# Patient Record
Sex: FEMALE | Race: WHITE | Employment: OTHER | URBAN - METROPOLITAN AREA
[De-identification: names, ages, dates, MRNs, and addresses within clinical notes are randomized per-mention and may not be internally consistent; named-entity substitution may affect disease eponyms.]

---

## 2017-01-16 ENCOUNTER — OFFICE VISIT (OUTPATIENT)
Dept: CARDIOLOGY CLINIC | Age: 82
End: 2017-01-16

## 2017-01-16 VITALS
HEART RATE: 68 BPM | WEIGHT: 173.8 LBS | HEIGHT: 61 IN | DIASTOLIC BLOOD PRESSURE: 70 MMHG | BODY MASS INDEX: 32.81 KG/M2 | SYSTOLIC BLOOD PRESSURE: 138 MMHG

## 2017-01-16 DIAGNOSIS — I10 HTN (HYPERTENSION), BENIGN: ICD-10-CM

## 2017-01-16 DIAGNOSIS — I10 ESSENTIAL HYPERTENSION: Primary | ICD-10-CM

## 2017-01-16 DIAGNOSIS — E78.5 DYSLIPIDEMIA: ICD-10-CM

## 2017-01-16 DIAGNOSIS — E55.9 VITAMIN D DEFICIENCY DISEASE: ICD-10-CM

## 2017-01-16 RX ORDER — HYDROCHLOROTHIAZIDE 25 MG/1
12.5 TABLET ORAL DAILY
Qty: 90 TAB | Refills: 2 | Status: SHIPPED | OUTPATIENT
Start: 2017-01-16 | End: 2018-03-09 | Stop reason: SDUPTHER

## 2017-01-16 RX ORDER — CARVEDILOL 12.5 MG/1
12.5 TABLET ORAL 2 TIMES DAILY WITH MEALS
Qty: 180 TAB | Refills: 3 | Status: SHIPPED | OUTPATIENT
Start: 2017-01-16 | End: 2017-08-23 | Stop reason: SDUPTHER

## 2017-01-16 NOTE — PROGRESS NOTES
Progress Note      Roosevelt Bess is a 80 y.o. female. Last seen 6 months ago by Dr. Dale Bansal. Problem List  Date Reviewed: 1/16/2017          Codes Class Noted    Dyslipidemia ICD-10-CM: E78.5  ICD-9-CM: 272.4  8/9/2013        Vitamin D deficiency disease ICD-10-CM: E55.9  ICD-9-CM: 268.9  8/9/2013        HTN (hypertension), benign ICD-10-CM: I10  ICD-9-CM: 401.1  6/25/2012            Cardiac testing:  Stress test cardiolite 5/18/12 - 3 min, normal perfusion, EF 72%   Echo 2d adult 5/18/12 - LVH, EF 60%   Echo 8/5/13 - EF 55 % to 60 %,no regional wall motion abnormalities. Wall thickness was mildly increased, mild MR, mild TR, no change from previous study 5/2012  Exercise Cardiolite 8/5/13 - normal, EF 70%  Echo 3/26/15- normal, EF 55 % to 60 %. Lexiscan cardiolite 3/26/15 - normal perfusion, LVEF 76%    HPI  She is doing well. She denies any interval issues. She has been less active recently due to the weather and fear of being outside. In the warmer months, she was walking often. She is now currently following a structured diet plan, notes occasional splurges. She is sleeping well. She denies any exertional chest pain, dyspnea, palpitations, syncope, orthopnea, edema or paroxysmal nocturnal dyspnea. Current Outpatient Prescriptions   Medication Sig Dispense Refill    hydrochlorothiazide (HYDRODIURIL) 25 mg tablet Take 0.5 Tabs by mouth daily. 90 Tab 3    carvedilol (COREG) 12.5 mg tablet Take 1 Tab by mouth two (2) times daily (with meals). 180 Tab 3    cholecalciferol, vitamin D3, (VITAMIN D3) 2,000 unit tab Take  by mouth.  aspirin 81 mg tablet Take 81 mg by mouth daily.             Allergies   Allergen Reactions    Statins-Hmg-Coa Reductase Inhibitors Myalgia        Past Medical History   Diagnosis Date    Dyslipidemia 8/9/2013    HTN (hypertension)     Hyperlipemia     Peripheral cyanosis     Peripheral neuropathy (HCC)     Tachycardia 6/25/2012    TIA (transient ischemic attack) 10/09    Vitamin D deficiency disease 8/9/2013      Review of Systems  Constitutional: Negative for weight loss, malaise/fatigue and diaphoresis. Respiratory: Negative for cough, hemoptysis, sputum production, shortness of breath and wheezing. Cardiovascular: Negative for chest pain, palpitations, orthopnea, claudication, leg swelling and PND. Gastrointestinal: Negative for heartburn, nausea, vomiting, blood in stool and melena. Genitourinary: Negative for dysuria, hematuria and flank pain. Musculoskeletal: Negative for back pain and joint pain. Neurological: Negative for dizziness, focal weakness, seizures, loss of consciousness, weakness and headaches. Endo/Heme/Allergies: Does not bruise/bleed easily. Psychiatric/Behavioral: Negative for mood disorders. Visit Vitals    /70    Pulse 68    Ht 5' 1\" (1.549 m)    Wt 173 lb 12.8 oz (78.8 kg)    BMI 32.84 kg/m2      Wt Readings from Last 3 Encounters:   01/16/17 173 lb 12.8 oz (78.8 kg)   07/11/16 170 lb (77.1 kg)   01/11/16 176 lb 6.4 oz (80 kg)     Physical Exam  General - well developed well nourished  Neck - JVP normal, thyroid normal  Cardiac - normal S1,S2, no murmurs, rubs or gallops. No clicks  Vascular - carotids without bruits, radials, femorals and pedal pulses equal bilateral  Lungs - clear to auscultation bilaterals, no rales, wheezing or rhonchi  Abd - soft nontender, no HSM, no abd bruits  Extremities - no edema  Skin - no rash  Neuro - nonfocal  Psych - normal mood and affect    Cardiographics  Lexiscan cardiolite 3/26/15 - normal perfusion, LVEF 76%  EKG 1/11/16 - SR, normal  EKG 1/16/17 - SR 65, normal     ASSESSMENT and PLAN  Encounter Diagnoses   Name Primary?  Essential hypertension Yes    HTN (hypertension), benign     Dyslipidemia     Vitamin D deficiency disease       Ms. Barbara Gan has chronic HTN controlled on combination therapy.  She has no evidence of ischemic or structural heart disease by [employer] Cardiolite in 3/2015. She denies any anginal symptoms at her current functional capacity. Encouraged her to seek out indoor walking opportunities to keep her aerobic exercise routine going. Encouraged a heart healthy diet. Advised her to monitor BP regularly at home and to call the office if SBP is >140 consistently. NMR profile shows lipid panel nearly at goal on no statin therapy (hx of myalgias). . Vitamin D within normal limit on daily replacement therapy. Plan to repeat NMR, CMP and Vit D again in 1 year. Encouraged her to call with any questions or concerns prior to her next office visit. Follow-up Disposition:  Return in about 1 year (around 1/16/2018).        Nicki Vieira NP

## 2017-01-16 NOTE — PROGRESS NOTES
Visit Vitals    /70    Pulse 68    Ht 5' 1\" (1.549 m)    Wt 173 lb 12.8 oz (78.8 kg)    BMI 32.84 kg/m2

## 2017-01-16 NOTE — PATIENT INSTRUCTIONS
Continue your current medications. Try to stay active and eat a heart healthy diet full of lean proteins, complex carbs/whole grains, fruits and veggies. Avoid simple/white carbs such as rice, bread, pasta and sweets. Keep checking your blood pressure every so often. Call the office if your top BP number is greater than 140 consistently.

## 2017-08-23 DIAGNOSIS — I10 HTN (HYPERTENSION), BENIGN: ICD-10-CM

## 2017-08-23 RX ORDER — CARVEDILOL 12.5 MG/1
TABLET ORAL
Qty: 180 TAB | Refills: 3 | Status: SHIPPED | OUTPATIENT
Start: 2017-08-23 | End: 2018-09-16 | Stop reason: SDUPTHER

## 2018-01-04 LAB
25(OH)D3+25(OH)D2 SERPL-MCNC: 28.8 NG/ML (ref 30–100)
ALBUMIN SERPL-MCNC: 4 G/DL (ref 3.5–4.7)
ALBUMIN/GLOB SERPL: 1.7 {RATIO} (ref 1.2–2.2)
ALP SERPL-CCNC: 78 IU/L (ref 39–117)
ALT SERPL-CCNC: 12 IU/L (ref 0–32)
AST SERPL-CCNC: 15 IU/L (ref 0–40)
BILIRUB SERPL-MCNC: 0.6 MG/DL (ref 0–1.2)
BUN SERPL-MCNC: 20 MG/DL (ref 8–27)
BUN/CREAT SERPL: 28 (ref 12–28)
CALCIUM SERPL-MCNC: 8.9 MG/DL (ref 8.7–10.3)
CHLORIDE SERPL-SCNC: 103 MMOL/L (ref 96–106)
CHOLEST SERPL-MCNC: 192 MG/DL (ref 100–199)
CO2 SERPL-SCNC: 28 MMOL/L (ref 18–29)
CREAT SERPL-MCNC: 0.72 MG/DL (ref 0.57–1)
GLOBULIN SER CALC-MCNC: 2.4 G/DL (ref 1.5–4.5)
GLUCOSE SERPL-MCNC: 96 MG/DL (ref 65–99)
HDLC SERPL-MCNC: 61 MG/DL
INTERPRETATION, 910389: NORMAL
LDLC SERPL CALC-MCNC: 109 MG/DL (ref 0–99)
POTASSIUM SERPL-SCNC: 4.2 MMOL/L (ref 3.5–5.2)
PROT SERPL-MCNC: 6.4 G/DL (ref 6–8.5)
SODIUM SERPL-SCNC: 142 MMOL/L (ref 134–144)
TRIGL SERPL-MCNC: 110 MG/DL (ref 0–149)
VLDLC SERPL CALC-MCNC: 22 MG/DL (ref 5–40)

## 2018-01-15 ENCOUNTER — OFFICE VISIT (OUTPATIENT)
Dept: CARDIOLOGY CLINIC | Age: 83
End: 2018-01-15

## 2018-01-15 VITALS
RESPIRATION RATE: 16 BRPM | HEART RATE: 70 BPM | HEIGHT: 61 IN | DIASTOLIC BLOOD PRESSURE: 84 MMHG | WEIGHT: 175.2 LBS | OXYGEN SATURATION: 99 % | BODY MASS INDEX: 33.08 KG/M2 | SYSTOLIC BLOOD PRESSURE: 142 MMHG

## 2018-01-15 DIAGNOSIS — E78.5 DYSLIPIDEMIA: ICD-10-CM

## 2018-01-15 DIAGNOSIS — I10 HTN (HYPERTENSION), BENIGN: Primary | ICD-10-CM

## 2018-01-15 NOTE — PROGRESS NOTES
Progress Note      Nick Hickey is a 80 y.o. female. Last seen 1 year ago by Kobe Hernandez NP and 1.5 years ago by me. Problem List  Date Reviewed: 1/16/2017          Codes Class Noted    Dyslipidemia ICD-10-CM: E78.5  ICD-9-CM: 272.4  8/9/2013        Vitamin D deficiency disease ICD-10-CM: E55.9  ICD-9-CM: 268.9  8/9/2013        HTN (hypertension), benign ICD-10-CM: I10  ICD-9-CM: 401.1  6/25/2012            Cardiac testing:  Stress test cardiolite 5/18/12 - 3 min, normal perfusion, EF 72%   Echo 2d adult 5/18/12 - LVH, EF 60%   Echo 8/5/13 - EF 55 % to 60 %,no regional wall motion abnormalities. Wall thickness was mildly increased, mild MR, mild TR, no change from previous study 5/2012  Exercise Cardiolite 8/5/13 - normal, EF 70%  Echo 3/26/15- normal, EF 55 % to 60 %. Lexiscan cardiolite 3/26/15 - normal perfusion, LVEF 76%    HPI  She is doing well overall and denies any interval issues. She is content with her quality of life. Her BPs at home have been under control. She denies any decreased appetite, difficulty sleeping, exertional chest pain, dyspnea, palpitations, syncope, orthopnea, edema or paroxysmal nocturnal dyspnea. She enjoys spending time with her 8 great grandchildren. Patient continues to remain independent and continues to drive. Current Outpatient Prescriptions   Medication Sig Dispense Refill    carvedilol (COREG) 12.5 mg tablet TAKE 1 TABLET BY MOUTH TWO TIMES A DAY WITH MEALS 180 Tab 3    hydroCHLOROthiazide (HYDRODIURIL) 25 mg tablet Take 0.5 Tabs by mouth daily. 90 Tab 2    aspirin 81 mg tablet Take 81 mg by mouth daily.  cholecalciferol, vitamin D3, (VITAMIN D3) 2,000 unit tab Take  by mouth.             Allergies   Allergen Reactions    Statins-Hmg-Coa Reductase Inhibitors Myalgia        Past Medical History:   Diagnosis Date    Dyslipidemia 8/9/2013    HTN (hypertension)     Hyperlipemia     Peripheral cyanosis     Peripheral neuropathy     Tachycardia 6/25/2012    TIA (transient ischemic attack) 10/09    Vitamin D deficiency disease 8/9/2013      Review of Systems  Constitutional: Negative for weight loss, malaise/fatigue and diaphoresis. Respiratory: Negative for cough, hemoptysis, sputum production, shortness of breath and wheezing. Cardiovascular: Negative for chest pain, palpitations, orthopnea, claudication, leg swelling and PND. Gastrointestinal: Negative for heartburn, nausea, vomiting, blood in stool and melena. Genitourinary: Negative for dysuria, hematuria and flank pain. Musculoskeletal: Negative for back pain and joint pain. Neurological: Negative for dizziness, focal weakness, seizures, loss of consciousness, weakness and headaches. Endo/Heme/Allergies: Does not bruise/bleed easily. Psychiatric/Behavioral: Negative for mood disorders. Visit Vitals    /84 (BP 1 Location: Left arm, BP Patient Position: Sitting)    Pulse 70    Resp 16    Ht 5' 1\" (1.549 m)    Wt 175 lb 3.2 oz (79.5 kg)    SpO2 99%    BMI 33.1 kg/m2      Wt Readings from Last 3 Encounters:   01/15/18 175 lb 3.2 oz (79.5 kg)   01/16/17 173 lb 12.8 oz (78.8 kg)   07/11/16 170 lb (77.1 kg)     Physical Exam  General - well developed well nourished  Neck - JVP normal, thyroid normal  Cardiac - normal S1,S2, no murmurs, rubs or gallops. No clicks  Vascular - carotids without bruits, radials, femorals and pedal pulses equal bilateral  Lungs - clear to auscultation bilaterals, no rales, wheezing or rhonchi  Abd - soft nontender, no HSM, no abd bruits  Extremities - no edema  Skin - no rash  Neuro - nonfocal  Psych - normal mood and affect    Cardiographics  Lexiscan cardiolite 3/26/15 - normal perfusion, LVEF 76%  EKG 1/11/16 - SR, normal  EKG 1/16/17 - SR 65, normal   EKG 1/15/18- SR, LAHB. LAHB is newly noted from 1 year ago. ASSESSMENT and PLAN  Encounter Diagnoses   Name Primary?  HTN (hypertension), benign Yes    Dyslipidemia       Ms. David Sanchez has chronic HTN controlled on combination therapy. She has no evidence of ischemic or structural heart disease by previous testing dating back to 2015. Her quality of life remains good at the age of 80. Lipids and metabolic profile are normal. I do not think annual testing is necessary at this time since she is on no statin therapy. Follow-up Disposition:  Return in about 1 year (around 1/15/2019).      Written by Robert Bergeron, dictated by Alondra Zuniga MD.  Alondra Zuniga MD

## 2018-01-15 NOTE — MR AVS SNAPSHOT
Visit Information Date & Time Provider Department Dept. Phone Encounter #  
 1/15/2018 10:00 AM Matthew Soni MD [facility] [place] [phone] [id number] Follow-up Instructions Return in about 1 year (around 1/15/2019). Upcoming Health Maintenance Date Due DTaP/Tdap/Td series (1 - Tdap) 3/25/1953 ZOSTER VACCINE AGE 60> 1/25/1992 GLAUCOMA SCREENING Q2Y 3/25/1997 OSTEOPOROSIS SCREENING (DEXA) 3/25/1997 Pneumococcal 65+ Low/Medium Risk (1 of 2 - PCV13) 3/25/1997 MEDICARE YEARLY EXAM 3/25/1997 Influenza Age 5 to Adult 8/1/2017 Allergies as of 1/15/2018  Review Complete On: 1/15/2018 By: Mame Suresh LPN Severity Noted Reaction Type Reactions Statins-hmg-coa Reductase Inhibitors  07/06/2015    Myalgia Current Immunizations  Never Reviewed No immunizations on file. Not reviewed this visit You Were Diagnosed With   
  
 Codes Comments HTN (hypertension), benign    -  Primary ICD-10-CM: I10 
ICD-9-CM: 401.1 Dyslipidemia     ICD-10-CM: E78.5 ICD-9-CM: 272.4 Vitals BP Pulse Resp Height(growth percentile) Weight(growth percentile) SpO2  
 142/84 (BP 1 Location: Left arm, BP Patient Position: Sitting) 70 16 5' 1\" (1.549 m) 175 lb 3.2 oz (79.5 kg) 99% BMI OB Status Smoking Status 33.1 kg/m2 Postmenopausal Never Smoker BMI and BSA Data Body Mass Index Body Surface Area  
 33.1 kg/m 2 1.85 m 2 Preferred Pharmacy Pharmacy Name Phone [id number] [address] [phone] Your Updated Medication List  
  
   
This list is accurate as of: 1/15/18 10:05 AM.  Always use your most recent med list.  
  
  
  
  
 aspirin 81 mg tablet Take 81 mg by mouth daily. carvedilol 12.5 mg tablet Commonly known as:  COREG  
TAKE 1 TABLET BY MOUTH TWO TIMES A DAY WITH MEALS  
  
 hydroCHLOROthiazide 25 mg tablet Commonly known as:  HYDRODIURIL Take 0.5 Tabs by mouth daily. VITAMIN D3 2,000 unit Tab Generic drug:  cholecalciferol (vitamin D3) Take  by mouth. We Performed the Following AMB POC EKG ROUTINE W/ 12 LEADS, INTER & REP [45199 CPT(R)] Follow-up Instructions Return in about 1 year (around 1/15/2019). Introducing [facility]! Carmelita Fothergill introduces Compute patient portal. Now you can access parts of your medical record, email your doctor's office, and request medication refills online. 1. In your internet browser, go to [email or web address] 2. Click on the First Time User? Click Here link in the Sign In box. You will see the New Member Sign Up page. 3. Enter your Compute Access Code exactly as it appears below. You will not need to use this code after youve completed the sign-up process. If you do not sign up before the expiration date, you must request a new code. · Compute Access Code: [email or web address] Expires: 4/15/2018 10:05 AM 
 
4. Enter the last four digits of your Social Security Number (xxxx) and Date of Birth (mm/dd/yyyy) as indicated and click Submit. You will be taken to the next sign-up page. 5. Create a Compute ID. This will be your Compute login ID and cannot be changed, so think of one that is secure and easy to remember. 6. Create a Compute password. You can change your password at any time. 7. Enter your Password Reset Question and Answer. This can be used at a later time if you forget your password. 8. Enter your e-mail address. You will receive e-mail notification when new information is available in [address] 9. Click Sign Up. You can now view and download portions of your medical record. 10. Click the Download Summary menu link to download a portable copy of your medical information.  
 
If you have questions, please visit the Frequently Asked Questions section of the [employer]. Remember, [employer] is NOT to be used for urgent needs. For medical emergencies, dial 911. Now available from your iPhone and Android! Please provide this summary of care documentation to your next provider. Your primary care clinician is listed as Nara Mcdowell. If you have any questions after today's visit, please call [phone].

## 2018-09-16 DIAGNOSIS — I10 HTN (HYPERTENSION), BENIGN: ICD-10-CM

## 2018-09-17 RX ORDER — CARVEDILOL 12.5 MG/1
TABLET ORAL
Qty: 180 TAB | Refills: 1 | Status: SHIPPED | OUTPATIENT
Start: 2018-09-17 | End: 2019-03-19 | Stop reason: SDUPTHER

## 2019-01-23 ENCOUNTER — OFFICE VISIT (OUTPATIENT)
Dept: FAMILY MEDICINE CLINIC | Age: 84
End: 2019-01-23

## 2019-01-23 VITALS
DIASTOLIC BLOOD PRESSURE: 76 MMHG | HEIGHT: 61 IN | WEIGHT: 173 LBS | BODY MASS INDEX: 32.66 KG/M2 | HEART RATE: 70 BPM | RESPIRATION RATE: 16 BRPM | OXYGEN SATURATION: 100 % | SYSTOLIC BLOOD PRESSURE: 133 MMHG | TEMPERATURE: 97.8 F

## 2019-01-23 DIAGNOSIS — E78.5 DYSLIPIDEMIA: ICD-10-CM

## 2019-01-23 DIAGNOSIS — H53.9 VISION CHANGES: ICD-10-CM

## 2019-01-23 DIAGNOSIS — I10 HTN (HYPERTENSION), BENIGN: Primary | ICD-10-CM

## 2019-01-23 DIAGNOSIS — Z01.818 PREOP EXAMINATION: ICD-10-CM

## 2019-01-23 RX ORDER — LISINOPRIL 5 MG/1
TABLET ORAL DAILY
COMMUNITY
End: 2020-02-01

## 2019-01-23 RX ORDER — OFLOXACIN 3 MG/ML
SOLUTION/ DROPS OPHTHALMIC
Refills: 1 | COMMUNITY
Start: 2019-01-21 | End: 2019-04-23 | Stop reason: ALTCHOICE

## 2019-01-23 RX ORDER — NEPAFENAC 3 MG/ML
SUSPENSION OPHTHALMIC
Refills: 1 | COMMUNITY
Start: 2019-01-21 | End: 2019-04-23 | Stop reason: ALTCHOICE

## 2019-01-23 RX ORDER — DUREZOL 0.5 MG/ML
EMULSION OPHTHALMIC
Refills: 1 | COMMUNITY
Start: 2019-01-21 | End: 2019-04-23 | Stop reason: ALTCHOICE

## 2019-01-23 RX ORDER — PRAVASTATIN SODIUM 20 MG/1
20 TABLET ORAL
COMMUNITY
End: 2020-02-01

## 2019-01-23 NOTE — PROGRESS NOTES
Identified pt with two pt identifiers(name and ). Chief Complaint   Patient presents with   Kiki Arboleda     cataract surgery tomorrow Dr. Ela Carlin 2019    Form Completion        Health Maintenance Due   Topic    DTaP/Tdap/Td series (1 - Tdap)    Shingrix Vaccine Age 50> (1 of 2)    GLAUCOMA SCREENING Q2Y     Bone Densitometry (Dexa) Screening     Pneumococcal 65+ Low/Medium Risk (1 of 2 - PCV13)    Influenza Age 5 to Adult     MEDICARE YEARLY EXAM        Wt Readings from Last 3 Encounters:   19 173 lb (78.5 kg)   01/15/18 175 lb 3.2 oz (79.5 kg)   17 173 lb 12.8 oz (78.8 kg)     Temp Readings from Last 3 Encounters:   13 97.7 °F (36.5 °C)   13 98.3 °F (36.8 °C)     BP Readings from Last 3 Encounters:   01/15/18 142/84   17 138/70   16 145/90     Pulse Readings from Last 3 Encounters:   01/15/18 70   17 68   16 88         Learning Assessment:  :     Learning Assessment 2014   PRIMARY LEARNER Patient   HIGHEST LEVEL OF EDUCATION - PRIMARY LEARNER  DID NOT GRADUATE HIGH SCHOOL   BARRIERS PRIMARY LEARNER NONE   CO-LEARNER CAREGIVER No   PRIMARY LANGUAGE ENGLISH   LEARNER PREFERENCE PRIMARY READING   LEARNING SPECIAL TOPICS none   ANSWERED BY patient   RELATIONSHIP SELF       Depression Screening:  :     No flowsheet data found. Fall Risk Assessment:  :     No flowsheet data found. Abuse Screening:  :     No flowsheet data found. Coordination of Care Questionnaire:  :     1) Have you been to an emergency room, urgent care clinic since your last visit? no  Hospitalized since your last visit? no             2) Have you seen or consulted any other health care providers outside of [address] [place] [address] since your last visit? yes  Cardiologist Dr. Leslie Arthur Doctor Dr. Ela Carlin  (Include any pap smears or colon screenings in this section.)    3) Do you have an Advance Directive on file?  no  Are you interested in receiving information about Advance Directives? no    Patient is accompanied by self I have received verbal consent from Theola Marker to discuss any/all medical information while they are present in the room. Reviewed record in preparation for visit and have obtained necessary documentation. Medication reconciliation up to date and corrected with patient at this time.

## 2019-01-23 NOTE — PROGRESS NOTES
Preoperative Evaluation    Date of Exam: 2019    Edd Brewer is a 80 y.o. female (:3/25/1932) who presents for preoperative evaluation. Procedure/Surgery: cataract surgery on left eye and on 19 on the right eye under regional anesthesia. Date of Procedure/Surgery: tomorrow    Surgeon: Dr Piyush Regan: [facility]    Primary Physician: Divina Blanco MD    Latex Allergy: no    Recent use of: No recent use of aspirin (ASA), NSAIDS or steroids. States she has not used ASA in 2 years. Anesthesia Complications: None    History of abnormal bleeding : None    History of Blood Transfusions: no    Health Care Directive or Living Will: no and is full code    She is able to climb a flight of stairs without chest pain or severe SOB    ETOH use:  No   Smoking status:  never    Pulmonary Risk: low risk    Allergies- reviewed: Allergies   Allergen Reactions    Statins-Hmg-Coa Reductase Inhibitors Myalgia         Medications- reviewed:   Current Outpatient Medications   Medication Sig    DUREZOL 0.05 % ophthalmic emulsion INSTILL 1 DROP INTO LEFT EYE FOUR TIMES A DAY AFTER SURGERY    ILEVRO 0.3 % drps INSTILL 1 DROP INTO LEFT EYE ONCE A DAY BEGIN ONE DAY BEFORE SURGERY    ofloxacin (FLOXIN) 0.3 % ophthalmic solution PUT 1 DROP INTO SURGERY EYE 4 TIMES A DAY STARTING THE DAY BEFORE SURGERY    carvedilol (COREG) 12.5 mg tablet TAKE 1 TABLET BY MOUTH TWO TIMES A DAY WITH MEALS    hydroCHLOROthiazide (HYDRODIURIL) 25 mg tablet TAKE 1/2 TABLET BY MOUTH DAILY.  lisinopril (PRINIVIL, ZESTRIL) 5 mg tablet Take  by mouth daily.  pravastatin (PRAVACHOL) 20 mg tablet Take 20 mg by mouth nightly.  cholecalciferol, vitamin D3, (VITAMIN D3) 2,000 unit tab Take  by mouth.  aspirin 81 mg tablet Take 81 mg by mouth daily. No current facility-administered medications for this visit.           Past Medical History- reviewed:  Past Medical History: Diagnosis Date    Chronic kidney failure, stage 2 (mild)     Dyslipidemia 8/9/2013    HTN (hypertension)     Hyperlipemia     Labile hypertension     Peripheral cyanosis     Peripheral cyanosis     Peripheral neuropathy     Peripheral neuropathy     Recurrent UTI     Tachycardia 6/25/2012    TIA (transient ischemic attack) 10/09    Vitamin D deficiency disease 8/9/2013         Past Surgical History- reviewed:   Past Surgical History:   Procedure Laterality Date    ECHO 2D ADULT  5/18/12    LVH, EF 60%    HX HERNIA REPAIR      right inguinal    STRESS TEST CARDIOLITE  5/18/12    3 min, normal perfusion, EF 72%         Social History- reviewed:  Social History     Socioeconomic History    Marital status:      Spouse name: Not on file    Number of children: Not on file    Years of education: Not on file    Highest education level: Not on file   Social Needs    Financial resource strain: Not on file    Food insecurity - worry: Not on file    Food insecurity - inability: Not on file   [employer] needs - medical: Not on file   [employer] needs - non-medical: Not on file   Occupational History    Not on file   Tobacco Use    Smoking status: Never Smoker    Smokeless tobacco: Never Used   Substance and Sexual Activity    Alcohol use: No    Drug use: No    Sexual activity: Not Currently   Other Topics Concern    Not on file   Social History Narrative    Not on file         Immunizations- reviewed: There is no immunization history on file for this patient. Review of systems:  Items bolded if positive. Constitutional: Fever, chills, night sweats, weight loss, lymphadenopathy, fatigue  HEENT: Vision change, eye pain, rhinorrhea, sinus pain, epistaxis, dysphagia, change in hearing, tinnitus, vertigo.    Endocrine: Weight change, heat/ cold intolerance, tremor, insomnia, polyuria, polydipsia, polyphagia, abnl hair growth, nail changes  Cardiovascular: Chest pain, palpitations, syncope, lower extremity edema, orthopnea, paroxysmal nocturnal dyspnea  Pulmonary: Shortness of breath, dyspnea on exertion, cough, hemoptysis, wheezing  GI: Nausea, vomiting, diarrhea, melena, hematochezia, change in appetite, abdominal pain, change in bowel habits or stools  : Dysuria, frequency, urgency, incontinence, hematuria, nocturia  Musculoskeletal: joint swelling or pain, muscle pain, back pain  Skin:  Rash, New/growing/changing skin lesions  Neurologic: Headache, muscle weakness, paresthesias, anesthesia, ataxia, change in speech, change in gait   Psychiatric: depression, anxiety, hallucinations, herrera, SI/HI      EXAM:   Visit Vitals  /76 (BP 1 Location: Left arm, BP Patient Position: Sitting)   Pulse 70   Temp 97.8 °F (36.6 °C) (Oral)   Resp 16   Ht 5' 1\" (1.549 m)   Wt 173 lb (78.5 kg)   SpO2 100%   BMI 32.69 kg/m²       General  no distress, well developed, well nourished  HEENT  oropharynx clear and moist mucous membranes  Eyes   EOMI and Conjunctivae Clear Bilaterally  Neck   full range of motion and supple  Respiratory  Clear Breath Sounds Bilaterally, No Increased Effort and Good Air Movement Bilaterally  Cardiovascular   RRR, S1S2, No murmur and Radial/Pedal Pulses 2+  Abdomen  soft, non tender and non distended  Skin  No Rash and Cap Refill less than 3 sec  Musculoskeletal no swelling or tenderness and strength normal and equal bilaterally  Neurology  AAO and CN II - XII grossly intact    Lab Results   Component Value Date/Time    Sodium 142 01/03/2018 12:00 AM    Potassium 4.2 01/03/2018 12:00 AM    Chloride 103 01/03/2018 12:00 AM    CO2 28 01/03/2018 12:00 AM    Anion gap 12 01/23/2013 08:08 PM    Glucose 96 01/03/2018 12:00 AM    BUN 20 01/03/2018 12:00 AM    Creatinine 0.72 01/03/2018 12:00 AM    BUN/Creatinine ratio 28 01/03/2018 12:00 AM    GFR est AA 88 01/03/2018 12:00 AM    GFR est non-AA 77 01/03/2018 12:00 AM    Calcium 8.9 01/03/2018 12:00 AM    Bilirubin, total 0.6 01/03/2018 12:00 AM    AST (SGOT) 15 01/03/2018 12:00 AM    Alk. phosphatase 78 01/03/2018 12:00 AM    Protein, total 6.4 01/03/2018 12:00 AM    Albumin 4.0 01/03/2018 12:00 AM    Globulin 3.5 09/18/2013 12:28 AM    A-G Ratio 1.7 01/03/2018 12:00 AM    ALT (SGPT) 12 01/03/2018 12:00 AM     Lab Results   Component Value Date/Time    WBC 8.2 09/18/2013 12:28 AM    WBC 6.6 06/25/2012 12:03 PM    Hemoglobin (POC) 13.3 09/18/2013 12:30 AM    HGB 13.3 09/18/2013 12:28 AM    Hematocrit (POC) 39 09/18/2013 12:30 AM    HCT 39.7 09/18/2013 12:28 AM    PLATELET 811 77/08/5072 12:28 AM    MCV 89.0 09/18/2013 12:28 AM     Lab Results   Component Value Date/Time    Cholesterol, total 192 01/03/2018 12:00 AM    HDL Cholesterol 61 01/03/2018 12:00 AM    LDL, calculated 109 (H) 01/03/2018 12:00 AM    VLDL, calculated 22 01/03/2018 12:00 AM    Triglyceride 110 01/03/2018 12:00 AM               IMPRESSION:     ICD-10-CM ICD-9-CM    1. HTN (hypertension), benign I10 401.1    2. Dyslipidemia E78.5 272.4    3. Preop examination Z01.818 V72.84    4. Vision changes H53.9 368.9        Pt presents for preoperative evaluation for preop for cataract surgery and is an acceptable candidate. BP well controlled. Hyperlipidemia stable. Chronic conditions are stable. Orders Placed This Encounter    lisinopril (PRINIVIL, ZESTRIL) 5 mg tablet     Sig: Take  by mouth daily.  pravastatin (PRAVACHOL) 20 mg tablet     Sig: Take 20 mg by mouth nightly.  DUREZOL 0.05 % ophthalmic emulsion     Sig: INSTILL 1 DROP INTO LEFT EYE FOUR TIMES A DAY AFTER SURGERY     Refill:  1    ILEVRO 0.3 % drps     Sig: INSTILL 1 DROP INTO LEFT EYE ONCE A DAY BEGIN ONE DAY BEFORE SURGERY     Refill:  1    ofloxacin (FLOXIN) 0.3 % ophthalmic solution     Sig: PUT 1 DROP INTO SURGERY EYE 4 TIMES A DAY STARTING THE DAY BEFORE SURGERY     Refill:  1         Estrella Bartlett MD  Family Medicine Resident

## 2019-03-20 DIAGNOSIS — I10 HTN (HYPERTENSION), BENIGN: ICD-10-CM

## 2019-03-20 RX ORDER — HYDROCHLOROTHIAZIDE 12.5 MG/1
12.5 TABLET ORAL DAILY
Qty: 30 TAB | Refills: 1 | Status: SHIPPED | OUTPATIENT
Start: 2019-03-20 | End: 2019-05-03 | Stop reason: SDUPTHER

## 2019-04-23 ENCOUNTER — OFFICE VISIT (OUTPATIENT)
Dept: CARDIOLOGY CLINIC | Age: 84
End: 2019-04-23

## 2019-04-23 VITALS
OXYGEN SATURATION: 96 % | DIASTOLIC BLOOD PRESSURE: 80 MMHG | HEART RATE: 74 BPM | HEIGHT: 61 IN | RESPIRATION RATE: 20 BRPM | SYSTOLIC BLOOD PRESSURE: 144 MMHG | BODY MASS INDEX: 32.66 KG/M2 | WEIGHT: 173 LBS

## 2019-04-23 DIAGNOSIS — E78.5 DYSLIPIDEMIA: ICD-10-CM

## 2019-04-23 DIAGNOSIS — I10 HTN (HYPERTENSION), BENIGN: Primary | ICD-10-CM

## 2019-04-23 NOTE — LETTER
4/29/19 Patient: Andrew Monroe YOB: 1932 Date of Visit: 4/23/2019 Kolton Naidu MD 
[address] [facility] [address] [facility] [address] [id number] VIA In Basket Dear Kolton Naidu MD, Thank you for referring Ms. Dakota Andre to [facility] for evaluation. My notes for this consultation are attached. If you have questions, please do not hesitate to call me. I look forward to following your patient along with you. Sincerely, Mila Tian MD

## 2019-04-23 NOTE — PROGRESS NOTES
Progress Note      Jae Thapa is a 80 y.o. female. Last seen 1 year ago by Rosenda Jarrett, RAHEEM and 1.5 years ago by me. Problem List  Date Reviewed: 1/23/2019          Codes Class Noted    Dyslipidemia ICD-10-CM: E78.5  ICD-9-CM: 272.4  8/9/2013        Vitamin D deficiency disease ICD-10-CM: E55.9  ICD-9-CM: 268.9  8/9/2013        HTN (hypertension), benign ICD-10-CM: I10  ICD-9-CM: 401.1  6/25/2012            Cardiac testing:  Stress test cardiolite 5/18/12 - 3 min, normal perfusion, EF 72%   Echo 2d adult 5/18/12 - LVH, EF 60%   Echo 8/5/13 - EF 55 % to 60 %,no regional wall motion abnormalities. Wall thickness was mildly increased, mild MR, mild TR, no change from previous study 5/2012  Exercise Cardiolite 8/5/13 - normal, EF 70%  Echo 3/26/15- normal, EF 55 % to 60 %. Lexiscan cardiolite 3/26/15 - normal perfusion, LVEF 76%    HPI    Ms. Shahrzad Lloyd reports doing well, no interval issues. She stays busy babysitting her great grandchildren  Checks BP occasionally at home, reports BP have been good as they run a little lower than 140's/80's  She is content with her quality of life; remains independent living by herself and driving. She denies any decreased appetite, difficulty sleeping, exertional chest pain, dyspnea, palpitations, syncope, orthopnea, edema or paroxysmal nocturnal dyspnea. She enjoys spending time with her 8 great grandchildren. Patient continues to remain independent and continues to drive. Current Outpatient Medications   Medication Sig Dispense Refill    hydroCHLOROthiazide (HYDRODIURIL) 12.5 mg tablet Take 1 Tab by mouth daily. 30 Tab 1    carvedilol (COREG) 12.5 mg tablet TAKE 1 TABLET BY MOUTH TWO TIMES A DAY WITH MEALS 180 Tab 0    lisinopril (PRINIVIL, ZESTRIL) 5 mg tablet Take  by mouth daily.  pravastatin (PRAVACHOL) 20 mg tablet Take 20 mg by mouth nightly.  cholecalciferol, vitamin D3, (VITAMIN D3) 2,000 unit tab Take  by mouth.       aspirin 81 mg tablet Take 81 mg by mouth daily. Allergies   Allergen Reactions    Statins-Hmg-Coa Reductase Inhibitors Myalgia        Past Medical History:   Diagnosis Date    Chronic kidney failure, stage 2 (mild)     Dyslipidemia 8/9/2013    HTN (hypertension)     Hyperlipemia     Labile hypertension     Peripheral cyanosis     Peripheral cyanosis     Peripheral neuropathy     Peripheral neuropathy     Recurrent UTI     Tachycardia 6/25/2012    TIA (transient ischemic attack) 10/09    Vitamin D deficiency disease 8/9/2013      Review of Systems  Constitutional: Negative for weight loss, malaise/fatigue and diaphoresis. Respiratory: Negative for cough, hemoptysis, sputum production, shortness of breath and wheezing. Cardiovascular: Negative for chest pain, palpitations, orthopnea, claudication, leg swelling and PND. Gastrointestinal: Negative for heartburn, nausea, vomiting, blood in stool and melena. Genitourinary: Negative for dysuria, hematuria and flank pain. Musculoskeletal: Negative for back pain and joint pain. Neurological: Negative for dizziness, focal weakness, seizures, loss of consciousness, weakness and headaches. Endo/Heme/Allergies: Does not bruise/bleed easily. Psychiatric/Behavioral: Negative for mood disorders. Visit Vitals  /80   Pulse 74   Resp 20   Ht 5' 1\" (1.549 m)   Wt 173 lb (78.5 kg)   SpO2 96%   BMI 32.69 kg/m²      Wt Readings from Last 3 Encounters:   04/23/19 173 lb (78.5 kg)   01/23/19 173 lb (78.5 kg)   01/15/18 175 lb 3.2 oz (79.5 kg)     Physical Exam  General - well developed well nourished  Neck - JVP normal, thyroid normal  Cardiac - normal S1,S2, no murmurs, rubs or gallops.  No clicks  Vascular - carotids without bruits, radials, femorals and pedal pulses equal bilateral  Lungs - clear to auscultation bilaterals, no rales, wheezing or rhonchi  Abd - soft nontender, no HSM, no abd bruits  Extremities - no edema  Skin - no rash  Neuro - nonfocal  Psych - normal mood and affect    Cardiographics  Lexiscan cardiolite 3/26/15 - normal perfusion, LVEF 76%  EKG 1/11/16 - SR, normal  EKG 1/16/17 - SR 65, normal   EKG 1/15/18- SR, LAHB. LAHB is newly noted from 1 year ago. EKG 4/23/19- SR, LAHB, no significant change from 1/15/18    ASSESSMENT and PLAN  Encounter Diagnoses   Name Primary?  HTN (hypertension), benign Yes    Dyslipidemia       Ms. Rigoberto Rojas has chronic HTN controlled on combination therapy. She has no evidence of ischemic or structural heart disease by previous testing dating back to 2015. Her quality of life remains good at the age of 80. She enjoys taking care of her great grandchildren. She remains fiercely independent. Follow-up and Dispositions    · Return in about 1 year (around 4/23/2020).           Written by Jay Palomares, as dictated by Dr. Edwige Viramontes MD.   Edwige Viramontes MD

## 2019-04-23 NOTE — PROGRESS NOTES
Visit Vitals  /80   Pulse 74   Resp 20   Ht 5' 1\" (1.549 m)   Wt 173 lb (78.5 kg)   SpO2 96%   BMI 32.69 kg/m²     EKG today  Denies CP,SOB,edema or dizziness

## 2019-05-03 DIAGNOSIS — I10 HTN (HYPERTENSION), BENIGN: ICD-10-CM

## 2019-05-03 RX ORDER — HYDROCHLOROTHIAZIDE 12.5 MG/1
12.5 TABLET ORAL DAILY
Qty: 90 TAB | Refills: 3 | Status: SHIPPED | OUTPATIENT
Start: 2019-05-03 | End: 2020-04-15 | Stop reason: SDUPTHER

## 2020-02-01 ENCOUNTER — HOSPITAL ENCOUNTER (EMERGENCY)
Age: 85
Discharge: HOME | End: 2020-02-01
Attending: EMERGENCY MEDICINE | Admitting: EMERGENCY MEDICINE
Payer: MEDICARE

## 2020-02-01 VITALS
WEIGHT: 171.74 LBS | RESPIRATION RATE: 16 BRPM | HEART RATE: 79 BPM | DIASTOLIC BLOOD PRESSURE: 81 MMHG | OXYGEN SATURATION: 98 % | SYSTOLIC BLOOD PRESSURE: 197 MMHG | TEMPERATURE: 97.7 F | BODY MASS INDEX: 32.45 KG/M2

## 2020-02-01 DIAGNOSIS — R21 RASH AND NONSPECIFIC SKIN ERUPTION: Primary | ICD-10-CM

## 2020-02-01 PROCEDURE — 99283 EMERGENCY DEPT VISIT LOW MDM: CPT

## 2020-02-02 NOTE — ED PROVIDER NOTES
Date of Service:  2/1/2020    Patient: Jessica Mao    Chief Complaint:  Rash       HPI:  Jessica Mao is a 80 y.o.  female who presents for evaluation of redness in her eyes. Patient states for the last day or so she has had redness under both eyes. She is started taking Benadryl today. She also describes some tingling in her scalp bilaterally. No history of any type of contact with anybody who is sick or has rashes. No recent travel. No fevers chills chest pain shortness of breath abdominal pain nausea vomiting diarrhea or any other acute complaints. Patient denies any change in her vision or hearing.            Past Medical History:   Diagnosis Date    Chronic kidney failure, stage 2 (mild)     Dyslipidemia 8/9/2013    HTN (hypertension)     Hyperlipemia     Labile hypertension     Peripheral cyanosis     Peripheral cyanosis     Peripheral neuropathy     Peripheral neuropathy     Recurrent UTI     Tachycardia 6/25/2012    TIA (transient ischemic attack) 10/09    Vitamin D deficiency disease 8/9/2013       Past Surgical History:   Procedure Laterality Date    ECHO 2D ADULT  5/18/12    LVH, EF 60%    HX HERNIA REPAIR      right inguinal    STRESS TEST CARDIOLITE  5/18/12    3 min, normal perfusion, EF 72%         Family History:   Problem Relation Age of Onset    Cancer Sister         breast cancer    COPD Sister     Diabetes Sister         type 2    Coronary Artery Disease Brother     Heart Surgery Brother         CABG    Diabetes Son         type 2       Social History     Socioeconomic History    Marital status:      Spouse name: Not on file    Number of children: Not on file    Years of education: Not on file    Highest education level: Not on file   Occupational History    Not on file   Social Needs    Financial resource strain: Not on file    Food insecurity:     Worry: Not on file     Inability: Not on file    Transportation needs:     Medical: Not on file Non-medical: Not on file   Tobacco Use    Smoking status: Never Smoker    Smokeless tobacco: Never Used   Substance and Sexual Activity    Alcohol use: No    Drug use: No    Sexual activity: Not Currently   Lifestyle    Physical activity:     Days per week: Not on file     Minutes per session: Not on file    Stress: Not on file   Relationships    Social connections:     Talks on phone: Not on file     Gets together: Not on file     Attends Congregational service: Not on file     Active member of club or organization: Not on file     Attends meetings of clubs or organizations: Not on file     Relationship status: Not on file    Intimate partner violence:     Fear of current or ex partner: Not on file     Emotionally abused: Not on file     Physically abused: Not on file     Forced sexual activity: Not on file   Other Topics Concern    Not on file   Social History Narrative    Not on file         ALLERGIES: Statins-hmg-coa reductase inhibitors    Review of Systems   Constitutional: Negative for fever. HENT: Negative for hearing loss. Eyes: Negative for visual disturbance. Respiratory: Negative for shortness of breath. Cardiovascular: Negative for chest pain. Gastrointestinal: Negative for abdominal pain. Genitourinary: Negative for flank pain. Musculoskeletal: Negative for back pain. Skin: Positive for rash. Neurological: Negative for dizziness, light-headedness and numbness. Psychiatric/Behavioral: Negative for confusion. Vitals:    02/01/20 2048   BP: 197/81   Pulse: 79   Resp: 16   Temp: 97.7 °F (36.5 °C)   SpO2: 98%   Weight: 77.9 kg (171 lb 11.8 oz)            Physical Exam  Vitals signs and nursing note reviewed. Constitutional:       General: She is not in acute distress. Appearance: She is well-developed. HENT:      Head: Normocephalic and atraumatic. Nose: Nose normal. No congestion.       Mouth/Throat:      Mouth: Mucous membranes are moist.      Pharynx: Oropharynx is clear. No posterior oropharyngeal erythema. Eyes:      General: No scleral icterus. Right eye: No discharge. Left eye: No discharge. Extraocular Movements: Extraocular movements intact. Conjunctiva/sclera: Conjunctivae normal.      Pupils: Pupils are equal, round, and reactive to light. Neck:      Musculoskeletal: Normal range of motion and neck supple. No neck rigidity. Vascular: No JVD. Trachea: No tracheal deviation. Cardiovascular:      Rate and Rhythm: Normal rate and regular rhythm. Heart sounds: No murmur. Pulmonary:      Effort: Pulmonary effort is normal. No respiratory distress. Abdominal:      General: Abdomen is flat. Musculoskeletal: Normal range of motion. Right lower leg: No edema. Left lower leg: No edema. Skin:     General: Skin is warm and dry. Capillary Refill: Capillary refill takes less than 2 seconds. Coloration: Skin is not jaundiced or pale. Findings: No bruising, erythema, lesion or rash. Rash is not crusting, macular, nodular, papular, purpuric, pustular, scaling, urticarial or vesicular. Neurological:      General: No focal deficit present. Mental Status: She is alert and oriented to person, place, and time. Psychiatric:         Mood and Affect: Mood normal.         Behavior: Behavior normal.         Thought Content: Thought content normal.         Judgment: Judgment normal.          MDM  Number of Diagnoses or Management Options  Rash and nonspecific skin eruption:         VITAL SIGNS:  Patient Vitals for the past 4 hrs:   Temp Pulse Resp BP SpO2   02/01/20 2048 97.7 °F (36.5 °C) 79 16 197/81 98 %         LABS:  No results found for this or any previous visit (from the past 6 hour(s)). IMAGING:  No orders to display         Medications During Visit:  Medications - No data to display      DECISION MAKING:  Isaak Jimenez is a 80 y.o. female who comes in as above.   No obvious rashes seen here. Specifically she has no vesicular rash or redness in her scalp. At this time I do note a very small amount of puffiness to the bottom lids however no overt redness. This time based on it being on the both eyes it could be allergic in nature and I suggested use of Zyrtec or Claritin or the like. She can continue to take Benadryl. Patient shows no signs of distress and there is no signs of any type of zoster here. Patient should follow with her primary care doctor for reevaluation next week. Disposition she is agreeable to this plan. All questions answered      IMPRESSION:  1. Rash and nonspecific skin eruption        DISPOSITION:  Discharged      Discharge Medication List as of 2/1/2020  9:32 PM           Follow-up Information     Follow up With Specialties Details Why Contact Info    Forest Montiel MD Family Practice Schedule an appointment as soon as possible for a visit   [address]  [phone]              The patient is asked to follow-up with their primary care provider in the next several days. They are to call tomorrow for an appointment. The patient is asked to return promptly for any increased concerns or worsening of symptoms. They can return to this emergency department or any other emergency department.       Procedures

## 2020-04-10 DIAGNOSIS — I10 HTN (HYPERTENSION), BENIGN: ICD-10-CM

## 2020-04-10 RX ORDER — CARVEDILOL 12.5 MG/1
TABLET ORAL
Qty: 180 TAB | Refills: 0 | Status: SHIPPED | OUTPATIENT
Start: 2020-04-10 | End: 2020-07-15 | Stop reason: SDUPTHER

## 2020-04-10 NOTE — TELEPHONE ENCOUNTER
Requested Prescriptions     Pending Prescriptions Disp Refills    carvediloL (COREG) 12.5 mg tablet [Pharmacy Med Name: CARVEDILOL 12.5 MG TABLET] 180 Tab 0     Sig: TAKE 1 TABLET BY MOUTH TWICE A DAY WITH MEALS     NOV 4/27/20   BASSAM Frausto

## 2020-04-15 ENCOUNTER — TELEPHONE (OUTPATIENT)
Dept: CARDIOLOGY CLINIC | Age: 85
End: 2020-04-15

## 2020-04-15 DIAGNOSIS — I10 HTN (HYPERTENSION), BENIGN: ICD-10-CM

## 2020-04-15 RX ORDER — HYDROCHLOROTHIAZIDE 12.5 MG/1
12.5 TABLET ORAL DAILY
Qty: 90 TAB | Refills: 0 | Status: SHIPPED | OUTPATIENT
Start: 2020-04-15 | End: 2020-07-15 | Stop reason: SDUPTHER

## 2020-04-15 NOTE — TELEPHONE ENCOUNTER
Neftali Brewer stated has no complaints at this time and would like to reschedule appt. R/S 7/15/20    Needs refill on HCTZ. Refill sent until appt. No labs since 2018. Patient states no other providers have done labs. Rolando Blair what labs would you like completed before OV?

## 2020-04-16 DIAGNOSIS — I10 HTN (HYPERTENSION), BENIGN: ICD-10-CM

## 2020-07-10 LAB
BUN SERPL-MCNC: 13 MG/DL (ref 8–27)
BUN/CREAT SERPL: 16 (ref 12–28)
CALCIUM SERPL-MCNC: 9.2 MG/DL (ref 8.7–10.3)
CHLORIDE SERPL-SCNC: 101 MMOL/L (ref 96–106)
CO2 SERPL-SCNC: 26 MMOL/L (ref 20–29)
CREAT SERPL-MCNC: 0.83 MG/DL (ref 0.57–1)
GLUCOSE SERPL-MCNC: 103 MG/DL (ref 65–99)
POTASSIUM SERPL-SCNC: 4.6 MMOL/L (ref 3.5–5.2)
SODIUM SERPL-SCNC: 141 MMOL/L (ref 134–144)

## 2020-07-14 NOTE — PROGRESS NOTES
[address]rent[address] Garcia [address]  [address]  [place], [address] [facility]    Office [phone]  Fax [phone]  Cell [phone]       Lit Landa is a 80 y.o. female last seen by me 4/23/2019. Diagnoses    Encounter Diagnoses     ICD-10-CM ICD-9-CM   1. HTN (hypertension), benign  I10 401.1       Assessment/Recommendations:    Chronic HTN controlled on combination therapy at home. She clearly has white coat phenomenon.  - Continue current treatment. Will refill medications  - Continue home BP monitoring. Her quality of life remains excellent at the age of 80. She remains independent. No other issues.     F/u in 1 year     Follow-up and Dispositions    · Return in about 1 year (around 7/15/2021). Subjective: Lit Landa reports no interval cardiac issues. She reports remaining active w/ walking. Patient denies any exertional chest pain, dyspnea, palpitations, syncope, orthopnea, edema or paroxysmal nocturnal dyspnea. Appetite and sleep good. BP at home 118. Cardiac risk factors   HTN yes  DM  no  Smoking no  Family hx of CAD yes, brother = CAD w/ heart surgery       Cardiac testing  Stress test cardiolite 5/18/12 - 3 min, normal perfusion, EF 72%   Echo 2d adult 5/18/12 - LVH, EF 60%   Echo 8/5/13 - EF 55 % to 60 %,no regional wall motion abnormalities. Wall thickness was mildly increased, mild MR, mild TR, no change from previous study 5/2012  Exercise Cardiolite 8/5/13 - normal, EF 70%  Echo 3/26/15- normal, EF 55 % to 60 %.    Lexiscan cardiolite 3/26/15 - normal perfusion, LVEF 76%      Past Medical History:   Diagnosis Date    Chronic kidney failure, stage 2 (mild)     Dyslipidemia 8/9/2013    HTN (hypertension)     Hyperlipemia     Labile hypertension     Peripheral cyanosis     Peripheral cyanosis     Peripheral neuropathy     Peripheral neuropathy     Recurrent UTI     Tachycardia 6/25/2012    TIA (transient ischemic attack) 10/09    Vitamin D deficiency disease 8/9/2013        Social History     Socioeconomic History    Marital status:      Spouse name: Not on file    Number of children: Not on file    Years of education: Not on file    Highest education level: Not on file   Tobacco Use    Smoking status: Never Smoker    Smokeless tobacco: Never Used   Substance and Sexual Activity    Alcohol use: No    Drug use: No    Sexual activity: Not Currently       Current Outpatient Medications   Medication Sig Dispense Refill    carvediloL (COREG) 12.5 mg tablet Take 1 Tab by mouth two (2) times a day. 180 Tab 3    hydroCHLOROthiazide (HYDRODIURIL) 12.5 mg tablet Take 1 Tab by mouth daily. 90 Tab 3       Allergies   Allergen Reactions    Statins-Hmg-Coa Reductase Inhibitors Myalgia          Review of Symptoms:  Constitutional: Negative for fever, chills, malaise/fatigue and diaphoresis. Respiratory: Negative for cough, hemoptysis, sputum production, and wheezing. Cardiovascular: Negative for chest pain, palpitations, orthopnea, claudication, leg swelling and PND. Gastrointestinal: Negative for heartburn, nausea, vomiting, blood in stool and melena. Genitourinary: Negative for dysuria and flank pain. Musculoskeletal: Negative for joint pain and back pain. Skin: Negative for rash. Neurological: Negative for focal weakness, seizures, loss of consciousness, weakness and headaches. Endo/Heme/Allergies: Does not bruise/bleed easily. Psychiatric/Behavioral: Negative for memory loss. The patient does not have insomnia.       Physical Exam  Visit Vitals  /90   Pulse 78   Resp 20   Ht 5' 1\" (1.549 m)   Wt 165 lb (74.8 kg)   SpO2 99%   BMI 31.18 kg/m²     Wt Readings from Last 3 Encounters:   07/15/20 165 lb (74.8 kg)   02/01/20 171 lb 11.8 oz (77.9 kg)   04/23/19 173 lb (78.5 kg)     General - WDWN  HEENT: Eyes without jaundice, Hearing intact  Neck - JVP normal, thyroid nl  Cardiac - normal S1,S2, no murmurs, rubs or gallops. No clicks  Vascular - carotids without bruits, radials, femorals and pedal pulses equal bilateral  Lungs - clear to auscultation bilaterals, no rales ,wheezing or rhonchi  Abd - soft nontender, no HSM, no abd bruits  Extremities - no edema  Neuro - nonfocal, normal gait  Psych - mood and affect normal    Labs:      Cardiographics    Stress test cardiolite 5/18/12 - 3 min, normal perfusion, EF 72%   Echo 2d adult 5/18/12 - LVH, EF 60%   Echo 8/5/13 - EF 55 % to 60 %,no regional wall motion abnormalities. Wall thickness was mildly increased, mild MR, mild TR, no change from previous study 5/2012  Exercise Cardiolite 8/5/13 - normal, EF 70%  Echo 3/26/15- normal, EF 55 % to 60 %. Lexiscan cardiolite 3/26/15 - normal perfusion, LVEF 76%  EKG 7/15/20 - SR, LAHB, IVCD, unchanged from 4/23/19      Written by Edvin Pinto, as dictated by Raquel Doss M.D.      Raquel Doss MD

## 2020-07-15 ENCOUNTER — OFFICE VISIT (OUTPATIENT)
Dept: CARDIOLOGY CLINIC | Age: 85
End: 2020-07-15

## 2020-07-15 VITALS
BODY MASS INDEX: 31.15 KG/M2 | HEART RATE: 78 BPM | WEIGHT: 165 LBS | DIASTOLIC BLOOD PRESSURE: 90 MMHG | HEIGHT: 61 IN | OXYGEN SATURATION: 99 % | SYSTOLIC BLOOD PRESSURE: 160 MMHG | RESPIRATION RATE: 20 BRPM

## 2020-07-15 DIAGNOSIS — I10 HTN (HYPERTENSION), BENIGN: Primary | ICD-10-CM

## 2020-07-15 RX ORDER — CARVEDILOL 12.5 MG/1
12.5 TABLET ORAL 2 TIMES DAILY
Qty: 180 TAB | Refills: 3 | Status: SHIPPED | OUTPATIENT
Start: 2020-07-15

## 2020-07-15 RX ORDER — HYDROCHLOROTHIAZIDE 12.5 MG/1
12.5 TABLET ORAL DAILY
Qty: 90 TAB | Refills: 3 | Status: SHIPPED | OUTPATIENT
Start: 2020-07-15

## 2020-07-15 NOTE — LETTER
7/15/20 Patient: Markos Joaquin YOB: 1932 Date of Visit: 7/15/2020 Debra Marcum MD 
[address] [facility] [address] [facility] 7 [phone] VIA In Basket Dear Debra Marcum MD, Thank you for referring Ms. Acacia Palencia to [facility] for evaluation. My notes for this consultation are attached. If you have questions, please do not hesitate to call me. I look forward to following your patient along with you. Sincerely, Henry Upton MD

## 2020-07-15 NOTE — PROGRESS NOTES
Visit Vitals  /90   Pulse 78   Resp 20   Ht 5' 1\" (1.549 m)   Wt 165 lb (74.8 kg)   SpO2 99%   BMI 31.18 kg/m²     Denies CP,SOB    EKG today

## 2023-05-25 RX ORDER — HYDROCHLOROTHIAZIDE 12.5 MG/1
12.5 TABLET ORAL DAILY
COMMUNITY
Start: 2020-07-15

## 2023-05-25 RX ORDER — CARVEDILOL 12.5 MG/1
12.5 TABLET ORAL 2 TIMES DAILY
COMMUNITY
Start: 2020-07-15

## 2023-12-14 ENCOUNTER — APPOINTMENT (OUTPATIENT)
Facility: HOSPITAL | Age: 88
End: 2023-12-14
Payer: MEDICARE

## 2023-12-14 ENCOUNTER — HOSPITAL ENCOUNTER (EMERGENCY)
Facility: HOSPITAL | Age: 88
Discharge: ANOTHER HOSPITAL | End: 2023-12-14
Attending: STUDENT IN AN ORGANIZED HEALTH CARE EDUCATION/TRAINING PROGRAM
Payer: MEDICARE

## 2023-12-14 VITALS
HEIGHT: 61 IN | RESPIRATION RATE: 29 BRPM | DIASTOLIC BLOOD PRESSURE: 63 MMHG | TEMPERATURE: 97 F | WEIGHT: 150 LBS | SYSTOLIC BLOOD PRESSURE: 159 MMHG | BODY MASS INDEX: 28.32 KG/M2 | OXYGEN SATURATION: 92 % | HEART RATE: 65 BPM

## 2023-12-14 DIAGNOSIS — I50.9 CONGESTIVE HEART FAILURE, UNSPECIFIED HF CHRONICITY, UNSPECIFIED HEART FAILURE TYPE (HCC): Primary | ICD-10-CM

## 2023-12-14 LAB
ALBUMIN SERPL-MCNC: 3.6 G/DL (ref 3.5–5)
ALBUMIN/GLOB SERPL: 1.1 (ref 1.1–2.2)
ALP SERPL-CCNC: 129 U/L (ref 45–117)
ALT SERPL-CCNC: 24 U/L (ref 12–78)
ANION GAP SERPL CALC-SCNC: 11 MMOL/L (ref 5–15)
AST SERPL-CCNC: 29 U/L (ref 15–37)
BASOPHILS # BLD: 0.1 K/UL (ref 0–0.1)
BASOPHILS NFR BLD: 1 % (ref 0–1)
BILIRUB SERPL-MCNC: 2.6 MG/DL (ref 0.2–1)
BUN SERPL-MCNC: 10 MG/DL (ref 6–20)
BUN/CREAT SERPL: 11 (ref 12–20)
CALCIUM SERPL-MCNC: 9.1 MG/DL (ref 8.5–10.1)
CHLORIDE SERPL-SCNC: 100 MMOL/L (ref 97–108)
CO2 SERPL-SCNC: 27 MMOL/L (ref 21–32)
CREAT SERPL-MCNC: 0.89 MG/DL (ref 0.55–1.02)
DIFFERENTIAL METHOD BLD: ABNORMAL
EKG ATRIAL RATE: 74 BPM
EKG DIAGNOSIS: NORMAL
EKG P AXIS: 18 DEGREES
EKG P-R INTERVAL: 160 MS
EKG Q-T INTERVAL: 380 MS
EKG QRS DURATION: 116 MS
EKG QTC CALCULATION (BAZETT): 421 MS
EKG R AXIS: -54 DEGREES
EKG T AXIS: 78 DEGREES
EKG VENTRICULAR RATE: 74 BPM
EOSINOPHIL # BLD: 0.5 K/UL (ref 0–0.4)
EOSINOPHIL NFR BLD: 4 % (ref 0–7)
ERYTHROCYTE [DISTWIDTH] IN BLOOD BY AUTOMATED COUNT: 20.3 % (ref 11.5–14.5)
GLOBULIN SER CALC-MCNC: 3.4 G/DL (ref 2–4)
GLUCOSE SERPL-MCNC: 112 MG/DL (ref 65–100)
HCT VFR BLD AUTO: 57.2 % (ref 35–47)
HGB BLD-MCNC: 17.2 G/DL (ref 11.5–16)
IMM GRANULOCYTES # BLD AUTO: 0.1 K/UL (ref 0–0.04)
IMM GRANULOCYTES NFR BLD AUTO: 1 % (ref 0–0.5)
LYMPHOCYTES # BLD: 0.9 K/UL (ref 0.8–3.5)
LYMPHOCYTES NFR BLD: 8 % (ref 12–49)
MCH RBC QN AUTO: 22.6 PG (ref 26–34)
MCHC RBC AUTO-ENTMCNC: 30.1 G/DL (ref 30–36.5)
MCV RBC AUTO: 75.1 FL (ref 80–99)
MONOCYTES # BLD: 0.7 K/UL (ref 0–1)
MONOCYTES NFR BLD: 6 % (ref 5–13)
NEUTS SEG # BLD: 9.5 K/UL (ref 1.8–8)
NEUTS SEG NFR BLD: 80 % (ref 32–75)
NRBC # BLD: 0 K/UL (ref 0–0.01)
NRBC BLD-RTO: 0 PER 100 WBC
NT PRO BNP: 1230 PG/ML (ref 0–450)
PLATELET # BLD AUTO: 622 K/UL (ref 150–400)
PMV BLD AUTO: 9.3 FL (ref 8.9–12.9)
POTASSIUM SERPL-SCNC: 3.5 MMOL/L (ref 3.5–5.1)
PROT SERPL-MCNC: 7 G/DL (ref 6.4–8.2)
RBC # BLD AUTO: 7.62 M/UL (ref 3.8–5.2)
RBC MORPH BLD: ABNORMAL
RBC MORPH BLD: ABNORMAL
SODIUM SERPL-SCNC: 138 MMOL/L (ref 136–145)
TROPONIN I SERPL HS-MCNC: 50 NG/L (ref 0–51)
WBC # BLD AUTO: 11.8 K/UL (ref 3.6–11)

## 2023-12-14 PROCEDURE — 93010 ELECTROCARDIOGRAM REPORT: CPT | Performed by: SPECIALIST

## 2023-12-14 PROCEDURE — 84484 ASSAY OF TROPONIN QUANT: CPT

## 2023-12-14 PROCEDURE — 85025 COMPLETE CBC W/AUTO DIFF WBC: CPT

## 2023-12-14 PROCEDURE — 93005 ELECTROCARDIOGRAM TRACING: CPT | Performed by: STUDENT IN AN ORGANIZED HEALTH CARE EDUCATION/TRAINING PROGRAM

## 2023-12-14 PROCEDURE — 94761 N-INVAS EAR/PLS OXIMETRY MLT: CPT

## 2023-12-14 PROCEDURE — 80053 COMPREHEN METABOLIC PANEL: CPT

## 2023-12-14 PROCEDURE — 71045 X-RAY EXAM CHEST 1 VIEW: CPT

## 2023-12-14 PROCEDURE — 83880 ASSAY OF NATRIURETIC PEPTIDE: CPT

## 2023-12-14 PROCEDURE — 36415 COLL VENOUS BLD VENIPUNCTURE: CPT

## 2023-12-14 PROCEDURE — 6360000002 HC RX W HCPCS: Performed by: STUDENT IN AN ORGANIZED HEALTH CARE EDUCATION/TRAINING PROGRAM

## 2023-12-14 PROCEDURE — 96374 THER/PROPH/DIAG INJ IV PUSH: CPT

## 2023-12-14 PROCEDURE — 99285 EMERGENCY DEPT VISIT HI MDM: CPT

## 2023-12-14 RX ORDER — FUROSEMIDE 10 MG/ML
40 INJECTION INTRAMUSCULAR; INTRAVENOUS ONCE
Status: COMPLETED | OUTPATIENT
Start: 2023-12-14 | End: 2023-12-14

## 2023-12-14 RX ADMIN — FUROSEMIDE 40 MG: 10 INJECTION, SOLUTION INTRAMUSCULAR; INTRAVENOUS at 11:08

## 2023-12-14 ASSESSMENT — PAIN DESCRIPTION - LOCATION: LOCATION: CHEST

## 2023-12-14 ASSESSMENT — PAIN SCALES - GENERAL: PAINLEVEL_OUTOF10: 5

## 2023-12-14 ASSESSMENT — PAIN - FUNCTIONAL ASSESSMENT: PAIN_FUNCTIONAL_ASSESSMENT: 0-10

## 2023-12-14 ASSESSMENT — PAIN DESCRIPTION - DESCRIPTORS: DESCRIPTORS: PRESSURE

## 2023-12-14 NOTE — ED NOTES
TRANSFER - OUT REPORT:    Verbal report given to Eugenie Villavicencio RN on Galina Humphreys  being transferred to [facility] for routine progression of patient care       Report consisted of patient's Situation, Background, Assessment and   Recommendations(SBAR). Information from the following report(s) Nurse Handoff Report, ED Encounter Summary, and ED SBAR was reviewed with the receiving nurse. [place] Fall Assessment:    Presents to emergency department  because of falls (Syncope, seizure, or loss of consciousness): No  Age > 70: Yes  Altered Mental Status, Intoxication with alcohol or substance confusion (Disorientation, impaired judgment, poor safety awaremess, or inability to follow instructions): No  Impaired Mobility: Ambulates or transfers with assistive devices or assistance; Unable to ambulate or transer.: No  Nursing Judgement: Yes          Lines:   Peripheral IV 12/14/23 Distal;Right; Anterior Cephalic (Active)        Opportunity for questions and clarification was provided.       Patient transported with:  Monitor          Vidal Chu RN  12/14/23 6715

## 2023-12-14 NOTE — ED TRIAGE NOTES
[facility] 15. Patient wheeled to treatment area accompanied by granddaughter  Patient's granddaughter states that patient has been put on amlodipine about 3 weeks ago but stopped last week. Since then she has had SOB worse with exertion. Patient has also had a cough recently as well. Patient's granddaughter states she has noticed and increase in B/L LE swelling as well.

## 2023-12-14 NOTE — ED NOTES
Patient's granddaughter requesting [facility] if admission is needed.      Renee Lee RN  12/14/23 2210

## 2023-12-14 NOTE — ED NOTES
[facility] contacted for patient transfer per patient request. [facility] accepted. Transfer intiated.      Dolores Yanes RN  12/14/23 1124

## 2023-12-14 NOTE — ED NOTES
TRANSFER - OUT REPORT:    Verbal report given to Taiwo Odonnell RN on Brooke Gill  being transferred to [facility] for routine progression of patient care       Report consisted of patient's Situation, Background, Assessment and   Recommendations(SBAR). Information from the following report(s) Nurse Handoff Report, ED Encounter Summary, and ED SBAR was reviewed with the receiving nurse. [place] Fall Assessment:                           Lines:   Peripheral IV 12/14/23 Distal;Right; Anterior Cephalic (Active)        Opportunity for questions and clarification was provided.       Patient transported with:  Monitor

## 2024-06-26 ENCOUNTER — APPOINTMENT (OUTPATIENT)
Facility: HOSPITAL | Age: 89
End: 2024-06-26
Payer: MEDICARE

## 2024-06-26 ENCOUNTER — HOSPITAL ENCOUNTER (EMERGENCY)
Facility: HOSPITAL | Age: 89
Discharge: HOME | End: 2024-06-26
Attending: EMERGENCY MEDICINE
Payer: MEDICARE

## 2024-06-26 VITALS
RESPIRATION RATE: 23 BRPM | OXYGEN SATURATION: 98 % | WEIGHT: 142 LBS | HEIGHT: 61 IN | BODY MASS INDEX: 26.81 KG/M2 | SYSTOLIC BLOOD PRESSURE: 140 MMHG | TEMPERATURE: 100.6 F | HEART RATE: 87 BPM | DIASTOLIC BLOOD PRESSURE: 59 MMHG

## 2024-06-26 DIAGNOSIS — N39.0 URINARY TRACT INFECTION WITH HEMATURIA, SITE UNSPECIFIED: Primary | ICD-10-CM

## 2024-06-26 DIAGNOSIS — R31.9 URINARY TRACT INFECTION WITH HEMATURIA, SITE UNSPECIFIED: Primary | ICD-10-CM

## 2024-06-26 DIAGNOSIS — R07.9 CHEST PAIN, UNSPECIFIED TYPE: ICD-10-CM

## 2024-06-26 DIAGNOSIS — R50.9 FEVER, UNSPECIFIED FEVER CAUSE: ICD-10-CM

## 2024-06-26 LAB
ALBUMIN SERPL-MCNC: 3.7 G/DL (ref 3.5–5)
ALBUMIN/GLOB SERPL: 1.2 (ref 1.1–2.2)
ALP SERPL-CCNC: 110 U/L (ref 45–117)
ALT SERPL-CCNC: 22 U/L (ref 12–78)
ANION GAP SERPL CALC-SCNC: 4 MMOL/L (ref 5–15)
APPEARANCE UR: ABNORMAL
AST SERPL-CCNC: 37 U/L (ref 15–37)
BACTERIA URNS QL MICRO: NEGATIVE /HPF
BASOPHILS # BLD: 0.1 K/UL (ref 0–0.1)
BASOPHILS NFR BLD: 1 % (ref 0–1)
BILIRUB SERPL-MCNC: 3.1 MG/DL (ref 0.2–1)
BILIRUB UR QL: NEGATIVE
BUN SERPL-MCNC: 13 MG/DL (ref 6–20)
BUN/CREAT SERPL: 14 (ref 12–20)
CALCIUM SERPL-MCNC: 9.2 MG/DL (ref 8.5–10.1)
CHLORIDE SERPL-SCNC: 105 MMOL/L (ref 97–108)
CO2 SERPL-SCNC: 27 MMOL/L (ref 21–32)
COLOR UR: ABNORMAL
COMMENT:: NORMAL
CREAT SERPL-MCNC: 0.96 MG/DL (ref 0.55–1.02)
DIFFERENTIAL METHOD BLD: ABNORMAL
EOSINOPHIL # BLD: 0.3 K/UL (ref 0–0.4)
EOSINOPHIL NFR BLD: 2 % (ref 0–7)
EPITH CASTS URNS QL MICRO: ABNORMAL /LPF
ERYTHROCYTE [DISTWIDTH] IN BLOOD BY AUTOMATED COUNT: 20 % (ref 11.5–14.5)
FLUAV RNA SPEC QL NAA+PROBE: NOT DETECTED
FLUBV RNA SPEC QL NAA+PROBE: NOT DETECTED
GLOBULIN SER CALC-MCNC: 3.1 G/DL (ref 2–4)
GLUCOSE SERPL-MCNC: 119 MG/DL (ref 65–100)
GLUCOSE UR STRIP.AUTO-MCNC: NEGATIVE MG/DL
HCT VFR BLD AUTO: 52.4 % (ref 35–47)
HGB BLD-MCNC: 14.9 G/DL (ref 11.5–16)
HGB UR QL STRIP: ABNORMAL
HYALINE CASTS URNS QL MICRO: ABNORMAL /LPF (ref 0–2)
IMM GRANULOCYTES # BLD AUTO: 0.1 K/UL (ref 0–0.04)
IMM GRANULOCYTES NFR BLD AUTO: 1 % (ref 0–0.5)
KETONES UR QL STRIP.AUTO: ABNORMAL MG/DL
LACTATE SERPL-SCNC: 1.7 MMOL/L (ref 0.4–2)
LEUKOCYTE ESTERASE UR QL STRIP.AUTO: ABNORMAL
LYMPHOCYTES # BLD: 0.7 K/UL (ref 0.8–3.5)
LYMPHOCYTES NFR BLD: 5 % (ref 12–49)
MAGNESIUM SERPL-MCNC: 2 MG/DL (ref 1.6–2.4)
MCH RBC QN AUTO: 20 PG (ref 26–34)
MCHC RBC AUTO-ENTMCNC: 28.4 G/DL (ref 30–36.5)
MCV RBC AUTO: 70.3 FL (ref 80–99)
MONOCYTES # BLD: 0.9 K/UL (ref 0–1)
MONOCYTES NFR BLD: 7 % (ref 5–13)
NEUTS SEG # BLD: 11 K/UL (ref 1.8–8)
NEUTS SEG NFR BLD: 84 % (ref 32–75)
NITRITE UR QL STRIP.AUTO: NEGATIVE
NRBC # BLD: 0 K/UL (ref 0–0.01)
NRBC BLD-RTO: 0 PER 100 WBC
PH UR STRIP: 5.5 (ref 5–8)
PLATELET # BLD AUTO: 595 K/UL (ref 150–400)
PLATELET COMMENT: ABNORMAL
PMV BLD AUTO: 10 FL (ref 8.9–12.9)
POTASSIUM SERPL-SCNC: 3.9 MMOL/L (ref 3.5–5.1)
PROCALCITONIN SERPL-MCNC: 0.08 NG/ML
PROT SERPL-MCNC: 6.8 G/DL (ref 6.4–8.2)
PROT UR STRIP-MCNC: NEGATIVE MG/DL
RBC # BLD AUTO: 7.45 M/UL (ref 3.8–5.2)
RBC #/AREA URNS HPF: ABNORMAL /HPF (ref 0–5)
RBC MORPH BLD: ABNORMAL
RBC MORPH BLD: ABNORMAL
SARS-COV-2 RNA RESP QL NAA+PROBE: NOT DETECTED
SODIUM SERPL-SCNC: 136 MMOL/L (ref 136–145)
SP GR UR REFRACTOMETRY: 1.01 (ref 1–1.03)
SPECIMEN HOLD: NORMAL
TROPONIN I SERPL HS-MCNC: 44 NG/L (ref 0–51)
URINE CULTURE IF INDICATED: ABNORMAL
UROBILINOGEN UR QL STRIP.AUTO: 0.2 EU/DL (ref 0.2–1)
WBC # BLD AUTO: 13.1 K/UL (ref 3.6–11)
WBC URNS QL MICRO: >100 /HPF (ref 0–4)

## 2024-06-26 PROCEDURE — 96374 THER/PROPH/DIAG INJ IV PUSH: CPT

## 2024-06-26 PROCEDURE — 84484 ASSAY OF TROPONIN QUANT: CPT

## 2024-06-26 PROCEDURE — 81001 URINALYSIS AUTO W/SCOPE: CPT

## 2024-06-26 PROCEDURE — 84145 PROCALCITONIN (PCT): CPT

## 2024-06-26 PROCEDURE — 87040 BLOOD CULTURE FOR BACTERIA: CPT

## 2024-06-26 PROCEDURE — 85025 COMPLETE CBC W/AUTO DIFF WBC: CPT

## 2024-06-26 PROCEDURE — 6370000000 HC RX 637 (ALT 250 FOR IP): Performed by: EMERGENCY MEDICINE

## 2024-06-26 PROCEDURE — 71045 X-RAY EXAM CHEST 1 VIEW: CPT

## 2024-06-26 PROCEDURE — 36415 COLL VENOUS BLD VENIPUNCTURE: CPT

## 2024-06-26 PROCEDURE — 80053 COMPREHEN METABOLIC PANEL: CPT

## 2024-06-26 PROCEDURE — 87147 CULTURE TYPE IMMUNOLOGIC: CPT

## 2024-06-26 PROCEDURE — 87636 SARSCOV2 & INF A&B AMP PRB: CPT

## 2024-06-26 PROCEDURE — 87154 CUL TYP ID BLD PTHGN 6+ TRGT: CPT

## 2024-06-26 PROCEDURE — 2580000003 HC RX 258: Performed by: EMERGENCY MEDICINE

## 2024-06-26 PROCEDURE — 6360000002 HC RX W HCPCS: Performed by: EMERGENCY MEDICINE

## 2024-06-26 PROCEDURE — 99284 EMERGENCY DEPT VISIT MOD MDM: CPT

## 2024-06-26 PROCEDURE — 94761 N-INVAS EAR/PLS OXIMETRY MLT: CPT

## 2024-06-26 PROCEDURE — 87086 URINE CULTURE/COLONY COUNT: CPT

## 2024-06-26 PROCEDURE — 83735 ASSAY OF MAGNESIUM: CPT

## 2024-06-26 PROCEDURE — 83605 ASSAY OF LACTIC ACID: CPT

## 2024-06-26 RX ORDER — CEFDINIR 300 MG/1
300 CAPSULE ORAL 2 TIMES DAILY
Qty: 14 CAPSULE | Refills: 0 | Status: SHIPPED | OUTPATIENT
Start: 2024-06-26 | End: 2024-07-03

## 2024-06-26 RX ORDER — ACETAMINOPHEN 325 MG/1
650 TABLET ORAL
Status: COMPLETED | OUTPATIENT
Start: 2024-06-26 | End: 2024-06-26

## 2024-06-26 RX ADMIN — WATER 1000 MG: 1 INJECTION INTRAMUSCULAR; INTRAVENOUS; SUBCUTANEOUS at 12:40

## 2024-06-26 RX ADMIN — ACETAMINOPHEN 650 MG: 325 TABLET ORAL at 10:49

## 2024-06-26 ASSESSMENT — ENCOUNTER SYMPTOMS
COUGH: 0
SORE THROAT: 0
VOMITING: 0

## 2024-06-26 ASSESSMENT — PAIN - FUNCTIONAL ASSESSMENT: PAIN_FUNCTIONAL_ASSESSMENT: NONE - DENIES PAIN

## 2024-06-26 NOTE — ED PROVIDER NOTES
EXTRA TUBES HOLD   LACTIC ACID       All other labs were within normal range or not returned as of this dictation.    EMERGENCY DEPARTMENT COURSE and DIFFERENTIAL DIAGNOSIS/MDM:   Vitals:    Vitals:    06/26/24 1036   BP: (!) 140/59   Pulse: 87   Resp: 16   Temp: (!) 100.7 °F (38.2 °C)   TempSrc: Oral   SpO2: 98%   Weight: 64.4 kg (142 lb)   Height: 1.549 m (5' 1\")           Medical Decision Making  Assessment: Patient presents from home with a complaint of chest pain.  Found to have a low-grade fever in the emergency department.  Appears well and in no distress.  EKG was nonischemic and troponin was normal.  Urine shows evidence of UTI with a slightly elevated white count.  Rest of her labs were largely unremarkable or at baseline.  Normal kidney function electrolytes.  Patient is resting comfortably with improved vital signs after Tylenol.  Tolerating p.o.'s without vomiting.  No complaints at this time.  I think she is appropriate for outpatient treatment with cefdinir.  Advised the family to return to the ER if she has vomiting, persistent fevers, any other concerning changes.  Otherwise I recommend she follow-up with her primary care doctor next week to ensure resolution.    Amount and/or Complexity of Data Reviewed  Labs: ordered.  Radiology: ordered.    Risk  OTC drugs.  Prescription drug management.            REASSESSMENT     ED Course as of 06/26/24 1154   Wed Jun 26, 2024   1041 ED ECG interpretation:  Rhythm: normal sinus rhythm. Rate (approx.): 85.  Axis: left.  LAFB.  ST segment:  No concerning ST elevations or depressions. This EKG was independently interpreted by Deny Davidson MD,ED Provider.   [JM]      ED Course User Index  [JM] Deny Davidson MD           CONSULTS:  None    PROCEDURES:  Unless otherwise noted below, none     Procedures      FINAL IMPRESSION      1. Urinary tract infection with hematuria, site unspecified    2. Fever, unspecified fever cause    3. Chest pain, unspecified

## 2024-06-26 NOTE — ED TRIAGE NOTES
Pt brought in by EMS for CP since 9am this morning. HX CHF. Fell on June 12. Pt denies CP on arrival to ER. EMS gave 324 ASA.

## 2024-06-27 ENCOUNTER — HOSPITAL ENCOUNTER (EMERGENCY)
Facility: HOSPITAL | Age: 89
Discharge: HOME | End: 2024-06-27
Attending: EMERGENCY MEDICINE
Payer: MEDICARE

## 2024-06-27 VITALS
HEIGHT: 61 IN | HEART RATE: 67 BPM | BODY MASS INDEX: 26.81 KG/M2 | OXYGEN SATURATION: 99 % | RESPIRATION RATE: 19 BRPM | SYSTOLIC BLOOD PRESSURE: 126 MMHG | WEIGHT: 142 LBS | TEMPERATURE: 97.7 F | DIASTOLIC BLOOD PRESSURE: 77 MMHG

## 2024-06-27 DIAGNOSIS — E87.6 HYPOKALEMIA: ICD-10-CM

## 2024-06-27 DIAGNOSIS — R79.89 ELEVATED SERUM CREATININE: ICD-10-CM

## 2024-06-27 DIAGNOSIS — R89.9 ABNORMAL LABORATORY TEST: Primary | ICD-10-CM

## 2024-06-27 LAB
ACCESSION NUMBER, LLC1M: ABNORMAL
ACINETOBACTER CALCOAC BAUMANNII COMPLEX BY PCR: NOT DETECTED
ALBUMIN SERPL-MCNC: 3.4 G/DL (ref 3.5–5)
ALBUMIN/GLOB SERPL: 1.2 (ref 1.1–2.2)
ALP SERPL-CCNC: 99 U/L (ref 45–117)
ALT SERPL-CCNC: 18 U/L (ref 12–78)
ANION GAP SERPL CALC-SCNC: 6 MMOL/L (ref 5–15)
AST SERPL-CCNC: 25 U/L (ref 15–37)
B FRAGILIS DNA BLD POS QL NAA+NON-PROBE: NOT DETECTED
BACTERIA SPEC CULT: ABNORMAL
BASOPHILS # BLD: 0.1 K/UL (ref 0–0.1)
BASOPHILS NFR BLD: 1 % (ref 0–1)
BILIRUB SERPL-MCNC: 3 MG/DL (ref 0.2–1)
BIOFIRE TEST COMMENT: ABNORMAL
BUN SERPL-MCNC: 22 MG/DL (ref 6–20)
BUN/CREAT SERPL: 18 (ref 12–20)
C ALBICANS DNA BLD POS QL NAA+NON-PROBE: NOT DETECTED
C AURIS DNA BLD POS QL NAA+NON-PROBE: NOT DETECTED
C GATTII+NEOFOR DNA BLD POS QL NAA+N-PRB: NOT DETECTED
C GLABRATA DNA BLD POS QL NAA+NON-PROBE: NOT DETECTED
C KRUSEI DNA BLD POS QL NAA+NON-PROBE: NOT DETECTED
C PARAP DNA BLD POS QL NAA+NON-PROBE: NOT DETECTED
C TROPICLS DNA BLD POS QL NAA+NON-PROBE: NOT DETECTED
CALCIUM SERPL-MCNC: 8.3 MG/DL (ref 8.5–10.1)
CC UR VC: ABNORMAL
CHLORIDE SERPL-SCNC: 101 MMOL/L (ref 97–108)
CO2 SERPL-SCNC: 28 MMOL/L (ref 21–32)
COMMENT:: NORMAL
CREAT SERPL-MCNC: 1.23 MG/DL (ref 0.55–1.02)
DIFFERENTIAL METHOD BLD: ABNORMAL
E CLOAC COMP DNA BLD POS NAA+NON-PROBE: NOT DETECTED
E COLI DNA BLD POS QL NAA+NON-PROBE: NOT DETECTED
E FAECALIS DNA BLD POS QL NAA+NON-PROBE: NOT DETECTED
E FAECIUM DNA BLD POS QL NAA+NON-PROBE: NOT DETECTED
ENTEROBACTERALES DNA BLD POS NAA+N-PRB: NOT DETECTED
EOSINOPHIL # BLD: 0.4 K/UL (ref 0–0.4)
EOSINOPHIL NFR BLD: 3 % (ref 0–7)
ERYTHROCYTE [DISTWIDTH] IN BLOOD BY AUTOMATED COUNT: 19.8 % (ref 11.5–14.5)
GLOBULIN SER CALC-MCNC: 2.9 G/DL (ref 2–4)
GLUCOSE SERPL-MCNC: 124 MG/DL (ref 65–100)
GP B STREP DNA BLD POS QL NAA+NON-PROBE: NOT DETECTED
HAEM INFLU DNA BLD POS QL NAA+NON-PROBE: NOT DETECTED
HCT VFR BLD AUTO: 51.3 % (ref 35–47)
HGB BLD-MCNC: 14.5 G/DL (ref 11.5–16)
IMM GRANULOCYTES # BLD AUTO: 0 K/UL (ref 0–0.04)
IMM GRANULOCYTES NFR BLD AUTO: 0 % (ref 0–0.5)
K OXYTOCA DNA BLD POS QL NAA+NON-PROBE: NOT DETECTED
KLEBSIELLA SP DNA BLD POS QL NAA+NON-PRB: NOT DETECTED
KLEBSIELLA SP DNA BLD POS QL NAA+NON-PRB: NOT DETECTED
L MONOCYTOG DNA BLD POS QL NAA+NON-PROBE: NOT DETECTED
LACTATE SERPL-SCNC: 1.2 MMOL/L (ref 0.4–2)
LYMPHOCYTES # BLD: 1.1 K/UL (ref 0.8–3.5)
LYMPHOCYTES NFR BLD: 9 % (ref 12–49)
MCH RBC QN AUTO: 20.2 PG (ref 26–34)
MCHC RBC AUTO-ENTMCNC: 28.3 G/DL (ref 30–36.5)
MCV RBC AUTO: 71.5 FL (ref 80–99)
MONOCYTES # BLD: 0.9 K/UL (ref 0–1)
MONOCYTES NFR BLD: 7 % (ref 5–13)
N MEN DNA BLD POS QL NAA+NON-PROBE: NOT DETECTED
NEUTS SEG # BLD: 9.7 K/UL (ref 1.8–8)
NEUTS SEG NFR BLD: 80 % (ref 32–75)
NRBC # BLD: 0 K/UL (ref 0–0.01)
NRBC BLD-RTO: 0 PER 100 WBC
P AERUGINOSA DNA BLD POS NAA+NON-PROBE: NOT DETECTED
PLATELET # BLD AUTO: 519 K/UL (ref 150–400)
PMV BLD AUTO: 9.9 FL (ref 8.9–12.9)
POTASSIUM SERPL-SCNC: 3.2 MMOL/L (ref 3.5–5.1)
PROT SERPL-MCNC: 6.3 G/DL (ref 6.4–8.2)
PROTEUS SP DNA BLD POS QL NAA+NON-PROBE: NOT DETECTED
RBC # BLD AUTO: 7.17 M/UL (ref 3.8–5.2)
RBC MORPH BLD: ABNORMAL
RESISTANT GENE TARGETS: ABNORMAL
S AUREUS DNA BLD POS QL NAA+NON-PROBE: NOT DETECTED
S AUREUS+CONS DNA BLD POS NAA+NON-PROBE: NOT DETECTED
S EPIDERMIDIS DNA BLD POS QL NAA+NON-PRB: NOT DETECTED
S LUGDUNENSIS DNA BLD POS QL NAA+NON-PRB: NOT DETECTED
S MALTOPHILIA DNA BLD POS QL NAA+NON-PRB: NOT DETECTED
S MARCESCENS DNA BLD POS NAA+NON-PROBE: NOT DETECTED
S PNEUM DNA BLD POS QL NAA+NON-PROBE: NOT DETECTED
S PYO DNA BLD POS QL NAA+NON-PROBE: NOT DETECTED
SALMONELLA DNA BLD POS QL NAA+NON-PROBE: NOT DETECTED
SERVICE CMNT-IMP: ABNORMAL
SODIUM SERPL-SCNC: 135 MMOL/L (ref 136–145)
SPECIMEN HOLD: NORMAL
STREPTOCOCCUS DNA BLD POS NAA+NON-PROBE: DETECTED
WBC # BLD AUTO: 12.2 K/UL (ref 3.6–11)

## 2024-06-27 PROCEDURE — 99283 EMERGENCY DEPT VISIT LOW MDM: CPT

## 2024-06-27 PROCEDURE — 36415 COLL VENOUS BLD VENIPUNCTURE: CPT

## 2024-06-27 PROCEDURE — 85025 COMPLETE CBC W/AUTO DIFF WBC: CPT

## 2024-06-27 PROCEDURE — 87040 BLOOD CULTURE FOR BACTERIA: CPT

## 2024-06-27 PROCEDURE — 80053 COMPREHEN METABOLIC PANEL: CPT

## 2024-06-27 PROCEDURE — 93005 ELECTROCARDIOGRAM TRACING: CPT | Performed by: FAMILY MEDICINE

## 2024-06-27 PROCEDURE — 6370000000 HC RX 637 (ALT 250 FOR IP)

## 2024-06-27 PROCEDURE — 83605 ASSAY OF LACTIC ACID: CPT

## 2024-06-27 RX ORDER — POTASSIUM CHLORIDE 750 MG/1
40 TABLET, FILM COATED, EXTENDED RELEASE ORAL ONCE
Status: COMPLETED | OUTPATIENT
Start: 2024-06-27 | End: 2024-06-27

## 2024-06-27 RX ADMIN — POTASSIUM CHLORIDE 40 MEQ: 750 TABLET, FILM COATED, EXTENDED RELEASE ORAL at 16:36

## 2024-06-27 ASSESSMENT — PAIN SCALES - GENERAL: PAINLEVEL_OUTOF10: 0

## 2024-06-27 ASSESSMENT — ENCOUNTER SYMPTOMS
ABDOMINAL PAIN: 0
SHORTNESS OF BREATH: 0
VOMITING: 0
NAUSEA: 0

## 2024-06-27 ASSESSMENT — PAIN - FUNCTIONAL ASSESSMENT: PAIN_FUNCTIONAL_ASSESSMENT: 0-10

## 2024-06-27 NOTE — ED PROVIDER NOTES
[facility] EMERGENCY DEPT  EMERGENCY DEPARTMENT ENCOUNTER      Pt Name: Laina Bashir  MRN: [id number]  Birthdate 3/25/1932  Date of evaluation: 6/27/2024  Provider: RITA Colón NP    CHIEF COMPLAINT       Chief Complaint   Patient presents with    Abnormal Lab    Urinary Tract Infection         HISTORY OF PRESENT ILLNESS   (Location/Symptom, Timing/Onset, Context/Setting, Quality, Duration, Modifying Factors, Severity)  Note limiting factors.   Laina Bashir is a 89 y.o. female presenting to the ED after being told to come back to the ER given positive blood culture and was encouraged to have labs repeated. Pt was dx w/ UTI and started on cefdinir yesterday. Pt reports taking a dose today morning. Pt was seen yesterday after \"feeling terrible\" and was having a temperature. Pt reports she was placed on fluid pill  and needs to urinate frequently.     Denies pains at this time, fevers today, dysuria.       The history is provided by the patient. No  was used.         Review of External Medical Records:     Nursing Notes were reviewed.    REVIEW OF SYSTEMS    (2-9 systems for level 4, 10 or more for level 5)     Review of Systems   Constitutional:  Negative for activity change, appetite change and fever.   Respiratory:  Negative for shortness of breath.    Cardiovascular:  Negative for chest pain.   Gastrointestinal:  Negative for abdominal pain, nausea and vomiting.   Genitourinary:  Positive for frequency. Negative for decreased urine volume, difficulty urinating and dysuria.   Musculoskeletal:  Negative for myalgias.   Skin:  Negative for rash.   All other systems reviewed and are negative.      Except as noted above the remainder of the review of systems was reviewed and negative.       PAST MEDICAL HISTORY     Past Medical History:   Diagnosis Date    Chronic kidney failure, stage 2 (mild)     Dyslipidemia 8/9/2013    HTN (hypertension)     Hyperlipemia     Labile hypertension

## 2024-06-27 NOTE — ED NOTES
Pt was discharged by Monica León  Pt verbalized good understanding of all discharge instructions, prescriptions, and follow up care.   All questions answered.  Pt in stable condition on discharge.

## 2024-06-27 NOTE — ED NOTES
6/27/24  @ 0515: positive blood culture result called from lab to this RN.  Positive result given to Dr. Holliday.

## 2024-06-27 NOTE — RESULT ENCOUNTER NOTE
+ strep in blood culture. Pt was febrile in ER. I called and spoke with patient concerning blood culture and need to return to ER for reevaluation, repeat labs and IV antibiotics. Pt acknowledges and will have family bring her back.

## 2024-06-27 NOTE — DISCHARGE INSTRUCTIONS
Stay well hydrated.     Take the antibiotic as instructed.     Follow-up closely with your primary care provider for reevaluation.

## 2024-06-27 NOTE — ED TRIAGE NOTES
Pt presents after a call received for an abnormal lab result. Pt was seen yesterday and discharged with a UTI, thinks the urine culture was positive. Denies fevers today. No pain noted. Is taking the antibiotic prescribed.

## 2024-06-28 LAB
EKG DIAGNOSIS: NORMAL
EKG Q-T INTERVAL: 438 MS
EKG QRS DURATION: 116 MS
EKG QTC CALCULATION (BAZETT): 465 MS
EKG R AXIS: -56 DEGREES
EKG T AXIS: 54 DEGREES
EKG VENTRICULAR RATE: 68 BPM

## 2024-06-29 ENCOUNTER — OFFICE VISIT (OUTPATIENT)
Age: 89
End: 2024-06-29

## 2024-06-29 VITALS
WEIGHT: 145.4 LBS | BODY MASS INDEX: 25.76 KG/M2 | HEART RATE: 57 BPM | SYSTOLIC BLOOD PRESSURE: 151 MMHG | TEMPERATURE: 98.1 F | RESPIRATION RATE: 18 BRPM | HEIGHT: 63 IN | DIASTOLIC BLOOD PRESSURE: 72 MMHG | OXYGEN SATURATION: 97 %

## 2024-06-29 DIAGNOSIS — B02.9 HERPES ZOSTER WITHOUT COMPLICATION: Primary | ICD-10-CM

## 2024-06-29 RX ORDER — VALACYCLOVIR HYDROCHLORIDE 1 G/1
1000 TABLET, FILM COATED ORAL 2 TIMES DAILY
Qty: 20 TABLET | Refills: 0 | Status: SHIPPED | OUTPATIENT
Start: 2024-06-29 | End: 2024-07-09

## 2024-06-29 RX ORDER — AMLODIPINE BESYLATE 2.5 MG/1
2.5 TABLET ORAL DAILY
COMMUNITY

## 2024-06-29 RX ORDER — FUROSEMIDE 20 MG/1
20 TABLET ORAL DAILY
COMMUNITY

## 2024-06-29 ASSESSMENT — ENCOUNTER SYMPTOMS
EYES NEGATIVE: 1
RESPIRATORY NEGATIVE: 1

## 2024-06-29 NOTE — ED NOTES
Received critical call from lab, blood cultures from 6/26 showing alpha Streptococcus in 1 of 2 bottles.  Patient is already returned to the emergency department on 6/27 for repeat cultures which show no growth at present.     Jadyn Holley PA-C  06/29/24 1004

## 2024-06-29 NOTE — PROGRESS NOTES
Laina Bashir (:  3/25/1932) is a 89 y.o. female,New patient, here for evaluation of the following chief complaint(s):  Thrush (Pt suspects thrush . Pain inside month, lip and nose. Pt has been using cefdinir, day three of medication )      Assessment & Plan :  Visit Diagnoses and Associated Orders       ORDERS WITHOUT AN ASSOCIATED DIAGNOSIS    furosemide (LASIX) 20 MG tablet [8014]      amLODIPine (NORVASC) 2.5 MG tablet [0079]            Current Outpatient Medications   Medication Instructions    amLODIPine (NORVASC) 2.5 mg, Oral, DAILY    carvedilol (COREG) 12.5 mg, Oral, 2 TIMES DAILY    cefdinir (OMNICEF) 300 mg, Oral, 2 TIMES DAILY    furosemide (LASIX) 20 mg, Oral, DAILY    hydroCHLOROthiazide 12.5 mg, Oral, DAILY                 Rx Valtrex as directed. Herpes Zoster, DD Herpes Labialis.             OTC medications and symptom management as directed.             Discussed s/sx to monitor for.             Follow up in 7-10 days if symptoms persist or if symptoms worsen.         Subjective :  HPI     89 y.o. female presents with symptoms of blisters.  C/o pain mouth, lip, nose.     UTI  and currently on Cefdinir.    Review of Systems   Constitutional: Negative.    HENT: Negative.     Eyes: Negative.    Respiratory: Negative.     Cardiovascular: Negative.    Musculoskeletal:  Negative for myalgias.   Skin:  Positive for rash.   Hematological:  Negative for adenopathy.         Vitals:    24 1625   BP: (!) 151/72   Pulse: 57   Resp: 18   Temp: 98.1 °F (36.7 °C)   SpO2: 97%   Weight: 66 kg (145 lb 6.4 oz)   Height: 1.6 m (5' 3\")       No results found for this visit on 24.      Objective   Physical Exam  Constitutional:       General: She is not in acute distress.     Appearance: Normal appearance. She is normal weight. She is not ill-appearing, toxic-appearing or diaphoretic.   Eyes:      Pupils: Pupils are equal, round, and reactive to light.   Cardiovascular:      Rate and Rhythm: Normal

## 2024-06-30 LAB
BACTERIA SPEC CULT: ABNORMAL
BACTERIA SPEC CULT: NORMAL
SERVICE CMNT-IMP: ABNORMAL
SERVICE CMNT-IMP: NORMAL

## 2024-07-02 LAB
BACTERIA SPEC CULT: NORMAL
SERVICE CMNT-IMP: NORMAL

## 2024-07-03 LAB
BACTERIA SPEC CULT: ABNORMAL
BACTERIA SPEC CULT: NORMAL
BACTERIA SPEC CULT: NORMAL
SERVICE CMNT-IMP: ABNORMAL
SERVICE CMNT-IMP: NORMAL
SERVICE CMNT-IMP: NORMAL

## 2025-07-28 ENCOUNTER — HOSPITAL ENCOUNTER (OUTPATIENT)
Facility: HOSPITAL | Age: 89
Setting detail: OBSERVATION
LOS: 1 days | Discharge: HOME | End: 2025-07-29
Attending: STUDENT IN AN ORGANIZED HEALTH CARE EDUCATION/TRAINING PROGRAM | Admitting: STUDENT IN AN ORGANIZED HEALTH CARE EDUCATION/TRAINING PROGRAM
Payer: MEDICARE

## 2025-07-28 ENCOUNTER — APPOINTMENT (OUTPATIENT)
Facility: HOSPITAL | Age: 89
End: 2025-07-28
Payer: MEDICARE

## 2025-07-28 ENCOUNTER — APPOINTMENT (OUTPATIENT)
Facility: HOSPITAL | Age: 89
End: 2025-07-28
Attending: INTERNAL MEDICINE
Payer: MEDICARE

## 2025-07-28 DIAGNOSIS — I48.91 ATRIAL FIBRILLATION WITH RVR (HCC): Primary | ICD-10-CM

## 2025-07-28 DIAGNOSIS — I50.9 ACUTE ON CHRONIC CONGESTIVE HEART FAILURE, UNSPECIFIED HEART FAILURE TYPE (HCC): ICD-10-CM

## 2025-07-28 DIAGNOSIS — I50.9 CONGESTIVE HEART FAILURE, UNSPECIFIED HF CHRONICITY, UNSPECIFIED HEART FAILURE TYPE (HCC): ICD-10-CM

## 2025-07-28 LAB
ALBUMIN SERPL-MCNC: 3.4 G/DL (ref 3.5–5)
ALBUMIN/GLOB SERPL: 1.2 (ref 1.1–2.2)
ALP SERPL-CCNC: 115 U/L (ref 45–117)
ALT SERPL-CCNC: 31 U/L (ref 12–78)
ANION GAP SERPL CALC-SCNC: 3 MMOL/L (ref 2–12)
AST SERPL-CCNC: 43 U/L (ref 15–37)
BASOPHILS # BLD: 0.2 K/UL (ref 0–0.1)
BASOPHILS NFR BLD: 1.6 % (ref 0–1)
BILIRUB SERPL-MCNC: 2.1 MG/DL (ref 0.2–1)
BUN SERPL-MCNC: 13 MG/DL (ref 6–20)
BUN/CREAT SERPL: 13 (ref 12–20)
CALCIUM SERPL-MCNC: 8.5 MG/DL (ref 8.5–10.1)
CHLORIDE SERPL-SCNC: 108 MMOL/L (ref 97–108)
CHOLEST SERPL-MCNC: 125 MG/DL
CO2 SERPL-SCNC: 28 MMOL/L (ref 21–32)
COMMENT:: NORMAL
CREAT SERPL-MCNC: 1 MG/DL (ref 0.55–1.02)
D DIMER PPP FEU-MCNC: 0.28 MG/L FEU (ref 0–0.65)
DIFFERENTIAL METHOD BLD: ABNORMAL
EKG ATRIAL RATE: 163 BPM
EKG DIAGNOSIS: NORMAL
EKG DIAGNOSIS: NORMAL
EKG Q-T INTERVAL: 312 MS
EKG Q-T INTERVAL: 400 MS
EKG QRS DURATION: 106 MS
EKG QRS DURATION: 114 MS
EKG QTC CALCULATION (BAZETT): 432 MS
EKG QTC CALCULATION (BAZETT): 464 MS
EKG R AXIS: -55 DEGREES
EKG R AXIS: -75 DEGREES
EKG T AXIS: 84 DEGREES
EKG T AXIS: 91 DEGREES
EKG VENTRICULAR RATE: 133 BPM
EKG VENTRICULAR RATE: 70 BPM
EOSINOPHIL # BLD: 0.51 K/UL (ref 0–0.4)
EOSINOPHIL NFR BLD: 4.2 % (ref 0–7)
ERYTHROCYTE [DISTWIDTH] IN BLOOD BY AUTOMATED COUNT: 20.7 % (ref 11.5–14.5)
EST. AVERAGE GLUCOSE BLD GHB EST-MCNC: 105 MG/DL
GLOBULIN SER CALC-MCNC: 2.9 G/DL (ref 2–4)
GLUCOSE SERPL-MCNC: 122 MG/DL (ref 65–100)
HBA1C MFR BLD: 5.3 % (ref 4–5.6)
HCT VFR BLD AUTO: 55.4 % (ref 35–47)
HDLC SERPL-MCNC: 62 MG/DL
HDLC SERPL: 2 (ref 0–5)
HGB BLD-MCNC: 15.1 G/DL (ref 11.5–16)
IMM GRANULOCYTES # BLD AUTO: 0.09 K/UL (ref 0–0.04)
IMM GRANULOCYTES NFR BLD AUTO: 0.7 % (ref 0–0.5)
LDLC SERPL CALC-MCNC: 49.4 MG/DL (ref 0–100)
LIPASE SERPL-CCNC: 40 U/L (ref 13–75)
LYMPHOCYTES # BLD: 0.99 K/UL (ref 0.8–3.5)
LYMPHOCYTES NFR BLD: 8.1 % (ref 12–49)
MAGNESIUM SERPL-MCNC: 2.1 MG/DL (ref 1.6–2.4)
MCH RBC QN AUTO: 19.1 PG (ref 26–34)
MCHC RBC AUTO-ENTMCNC: 27.3 G/DL (ref 30–36.5)
MCV RBC AUTO: 70 FL (ref 80–99)
MONOCYTES # BLD: 0.7 K/UL (ref 0–1)
MONOCYTES NFR BLD: 5.7 % (ref 5–13)
NEUTS SEG # BLD: 9.72 K/UL (ref 1.8–8)
NEUTS SEG NFR BLD: 79.7 % (ref 32–75)
NRBC # BLD: 0 K/UL (ref 0–0.01)
NRBC BLD-RTO: 0 PER 100 WBC
NT PRO BNP: 3235 PG/ML
PLATELET # BLD AUTO: 593 K/UL (ref 150–400)
PLATELET COMMENT: ABNORMAL
PMV BLD AUTO: 9.6 FL (ref 8.9–12.9)
POTASSIUM SERPL-SCNC: 4.2 MMOL/L (ref 3.5–5.1)
PROT SERPL-MCNC: 6.3 G/DL (ref 6.4–8.2)
RBC # BLD AUTO: 7.91 M/UL (ref 3.8–5.2)
RBC MORPH BLD: ABNORMAL
SODIUM SERPL-SCNC: 139 MMOL/L (ref 136–145)
SPECIMEN HOLD: NORMAL
T4 FREE SERPL-MCNC: 1 NG/DL (ref 0.8–1.5)
TRIGL SERPL-MCNC: 68 MG/DL
TROPONIN I SERPL HS-MCNC: 81 NG/L (ref 0–51)
TROPONIN I SERPL HS-MCNC: 84 NG/L (ref 0–51)
TSH SERPL DL<=0.05 MIU/L-ACNC: 1.52 UIU/ML (ref 0.36–3.74)
VLDLC SERPL CALC-MCNC: 13.6 MG/DL
WBC # BLD AUTO: 12.2 K/UL (ref 3.6–11)
WBC MORPH BLD: ABNORMAL

## 2025-07-28 PROCEDURE — 83690 ASSAY OF LIPASE: CPT

## 2025-07-28 PROCEDURE — 93005 ELECTROCARDIOGRAM TRACING: CPT | Performed by: NURSE PRACTITIONER

## 2025-07-28 PROCEDURE — 99223 1ST HOSP IP/OBS HIGH 75: CPT | Performed by: INTERNAL MEDICINE

## 2025-07-28 PROCEDURE — 96374 THER/PROPH/DIAG INJ IV PUSH: CPT

## 2025-07-28 PROCEDURE — 71045 X-RAY EXAM CHEST 1 VIEW: CPT

## 2025-07-28 PROCEDURE — 85379 FIBRIN DEGRADATION QUANT: CPT

## 2025-07-28 PROCEDURE — 84484 ASSAY OF TROPONIN QUANT: CPT

## 2025-07-28 PROCEDURE — 99285 EMERGENCY DEPT VISIT HI MDM: CPT

## 2025-07-28 PROCEDURE — 1100000000 HC RM PRIVATE

## 2025-07-28 PROCEDURE — 36415 COLL VENOUS BLD VENIPUNCTURE: CPT

## 2025-07-28 PROCEDURE — 6360000002 HC RX W HCPCS: Performed by: STUDENT IN AN ORGANIZED HEALTH CARE EDUCATION/TRAINING PROGRAM

## 2025-07-28 PROCEDURE — 6360000002 HC RX W HCPCS: Performed by: INTERNAL MEDICINE

## 2025-07-28 PROCEDURE — 93010 ELECTROCARDIOGRAM REPORT: CPT | Performed by: STUDENT IN AN ORGANIZED HEALTH CARE EDUCATION/TRAINING PROGRAM

## 2025-07-28 PROCEDURE — 84443 ASSAY THYROID STIM HORMONE: CPT

## 2025-07-28 PROCEDURE — 84439 ASSAY OF FREE THYROXINE: CPT

## 2025-07-28 PROCEDURE — 93306 TTE W/DOPPLER COMPLETE: CPT

## 2025-07-28 PROCEDURE — 93005 ELECTROCARDIOGRAM TRACING: CPT | Performed by: STUDENT IN AN ORGANIZED HEALTH CARE EDUCATION/TRAINING PROGRAM

## 2025-07-28 PROCEDURE — 80053 COMPREHEN METABOLIC PANEL: CPT

## 2025-07-28 PROCEDURE — 83735 ASSAY OF MAGNESIUM: CPT

## 2025-07-28 PROCEDURE — APPSS45 APP SPLIT SHARED TIME 31-45 MINUTES: Performed by: NURSE PRACTITIONER

## 2025-07-28 PROCEDURE — 2500000003 HC RX 250 WO HCPCS: Performed by: INTERNAL MEDICINE

## 2025-07-28 PROCEDURE — 83880 ASSAY OF NATRIURETIC PEPTIDE: CPT

## 2025-07-28 PROCEDURE — 94761 N-INVAS EAR/PLS OXIMETRY MLT: CPT

## 2025-07-28 PROCEDURE — 80061 LIPID PANEL: CPT

## 2025-07-28 PROCEDURE — 6370000000 HC RX 637 (ALT 250 FOR IP): Performed by: NURSE PRACTITIONER

## 2025-07-28 PROCEDURE — 85025 COMPLETE CBC W/AUTO DIFF WBC: CPT

## 2025-07-28 PROCEDURE — 83036 HEMOGLOBIN GLYCOSYLATED A1C: CPT

## 2025-07-28 RX ORDER — IBUPROFEN 200 MG
200 TABLET ORAL DAILY
Status: ON HOLD | COMMUNITY
End: 2025-07-29 | Stop reason: HOSPADM

## 2025-07-28 RX ORDER — ONDANSETRON 2 MG/ML
4 INJECTION INTRAMUSCULAR; INTRAVENOUS EVERY 6 HOURS PRN
Status: DISCONTINUED | OUTPATIENT
Start: 2025-07-28 | End: 2025-07-29 | Stop reason: HOSPADM

## 2025-07-28 RX ORDER — SODIUM CHLORIDE 9 MG/ML
INJECTION, SOLUTION INTRAVENOUS PRN
Status: DISCONTINUED | OUTPATIENT
Start: 2025-07-28 | End: 2025-07-29 | Stop reason: HOSPADM

## 2025-07-28 RX ORDER — MAGNESIUM HYDROXIDE/ALUMINUM HYDROXICE/SIMETHICONE 120; 1200; 1200 MG/30ML; MG/30ML; MG/30ML
30 SUSPENSION ORAL EVERY 6 HOURS PRN
Status: DISCONTINUED | OUTPATIENT
Start: 2025-07-28 | End: 2025-07-29 | Stop reason: HOSPADM

## 2025-07-28 RX ORDER — SODIUM CHLORIDE 0.9 % (FLUSH) 0.9 %
5-40 SYRINGE (ML) INJECTION PRN
Status: DISCONTINUED | OUTPATIENT
Start: 2025-07-28 | End: 2025-07-29 | Stop reason: HOSPADM

## 2025-07-28 RX ORDER — POLYETHYLENE GLYCOL 3350 17 G
2 POWDER IN PACKET (EA) ORAL
Status: DISCONTINUED | OUTPATIENT
Start: 2025-07-28 | End: 2025-07-29 | Stop reason: HOSPADM

## 2025-07-28 RX ORDER — ONDANSETRON 4 MG/1
4 TABLET, ORALLY DISINTEGRATING ORAL EVERY 8 HOURS PRN
Status: DISCONTINUED | OUTPATIENT
Start: 2025-07-28 | End: 2025-07-29 | Stop reason: HOSPADM

## 2025-07-28 RX ORDER — ENOXAPARIN SODIUM 100 MG/ML
40 INJECTION SUBCUTANEOUS DAILY
Status: DISCONTINUED | OUTPATIENT
Start: 2025-07-28 | End: 2025-07-29

## 2025-07-28 RX ORDER — SODIUM CHLORIDE 0.9 % (FLUSH) 0.9 %
5-40 SYRINGE (ML) INJECTION EVERY 12 HOURS SCHEDULED
Status: DISCONTINUED | OUTPATIENT
Start: 2025-07-28 | End: 2025-07-29 | Stop reason: HOSPADM

## 2025-07-28 RX ORDER — CARVEDILOL 12.5 MG/1
12.5 TABLET ORAL 2 TIMES DAILY WITH MEALS
Status: DISCONTINUED | OUTPATIENT
Start: 2025-07-28 | End: 2025-07-29

## 2025-07-28 RX ORDER — FUROSEMIDE 10 MG/ML
40 INJECTION INTRAMUSCULAR; INTRAVENOUS DAILY
Status: DISCONTINUED | OUTPATIENT
Start: 2025-07-28 | End: 2025-07-29 | Stop reason: HOSPADM

## 2025-07-28 RX ORDER — ACETAMINOPHEN 650 MG/1
650 SUPPOSITORY RECTAL EVERY 6 HOURS PRN
Status: DISCONTINUED | OUTPATIENT
Start: 2025-07-28 | End: 2025-07-29 | Stop reason: HOSPADM

## 2025-07-28 RX ORDER — ACETAMINOPHEN 325 MG/1
650 TABLET ORAL EVERY 6 HOURS PRN
Status: DISCONTINUED | OUTPATIENT
Start: 2025-07-28 | End: 2025-07-29 | Stop reason: HOSPADM

## 2025-07-28 RX ORDER — POTASSIUM CHLORIDE 7.45 MG/ML
10 INJECTION INTRAVENOUS PRN
Status: DISCONTINUED | OUTPATIENT
Start: 2025-07-28 | End: 2025-07-29 | Stop reason: HOSPADM

## 2025-07-28 RX ORDER — FUROSEMIDE 10 MG/ML
20 INJECTION INTRAMUSCULAR; INTRAVENOUS
Status: COMPLETED | OUTPATIENT
Start: 2025-07-28 | End: 2025-07-28

## 2025-07-28 RX ORDER — POTASSIUM CHLORIDE 750 MG/1
40 TABLET, EXTENDED RELEASE ORAL PRN
Status: DISCONTINUED | OUTPATIENT
Start: 2025-07-28 | End: 2025-07-29 | Stop reason: HOSPADM

## 2025-07-28 RX ORDER — MAGNESIUM SULFATE IN WATER 40 MG/ML
2000 INJECTION, SOLUTION INTRAVENOUS PRN
Status: DISCONTINUED | OUTPATIENT
Start: 2025-07-28 | End: 2025-07-29 | Stop reason: HOSPADM

## 2025-07-28 RX ORDER — POLYETHYLENE GLYCOL 3350 17 G/17G
17 POWDER, FOR SOLUTION ORAL DAILY PRN
Status: DISCONTINUED | OUTPATIENT
Start: 2025-07-28 | End: 2025-07-29 | Stop reason: HOSPADM

## 2025-07-28 RX ADMIN — CARVEDILOL 12.5 MG: 12.5 TABLET, FILM COATED ORAL at 16:39

## 2025-07-28 RX ADMIN — ENOXAPARIN SODIUM 40 MG: 100 INJECTION SUBCUTANEOUS at 17:12

## 2025-07-28 RX ADMIN — FUROSEMIDE 20 MG: 10 INJECTION, SOLUTION INTRAMUSCULAR; INTRAVENOUS at 13:00

## 2025-07-28 RX ADMIN — SODIUM CHLORIDE, PRESERVATIVE FREE 10 ML: 5 INJECTION INTRAVENOUS at 19:30

## 2025-07-28 ASSESSMENT — PAIN - FUNCTIONAL ASSESSMENT: PAIN_FUNCTIONAL_ASSESSMENT: NONE - DENIES PAIN

## 2025-07-28 ASSESSMENT — LIFESTYLE VARIABLES
HOW OFTEN DO YOU HAVE A DRINK CONTAINING ALCOHOL: NEVER
HOW MANY STANDARD DRINKS CONTAINING ALCOHOL DO YOU HAVE ON A TYPICAL DAY: PATIENT DOES NOT DRINK

## 2025-07-28 NOTE — CONSULTS
mildly increased, mild MR, mild TR, no change from previous study 5/2012  Exercise Cardiolite 8/5/13 - normal, EF 70%  Echo 3/26/15- normal, EF 55 % to 60 %.   Lexiscan cardiolite 3/26/15 - normal perfusion, LVEF 76%         Most recent HS troponins:  Recent Labs     07/28/25  1041   TROPHS 84*       ECG:   Encounter Date: 07/28/25   EKG 12 Lead   Result Value    Ventricular Rate 153    Atrial Rate 153    QRS Duration 114    Q-T Interval 302    QTc Calculation (Bazett) 482    R Axis -78    T Axis 89    Diagnosis      Supraventricular tachycardia  Left anterior fascicular block  Anterior infarct (cited on or before 26-JUN-2024)  Abnormal ECG  When compared with ECG of 27-JUN-2024 16:26,  Sinus rhythm has replaced Atrial fibrillation  Vent. rate has increased BY  85 BPM  Inverted T waves have replaced nonspecific T wave abnormality in Lateral   leads           Review of Systems:    [x]All other systems reviewed and all negative except as written in HPI    [] Patient unable to provide secondary to condition    Past Medical History:   Diagnosis Date    Chronic kidney failure, stage 2 (mild)     Dyslipidemia 8/9/2013    HTN (hypertension)     Hyperlipemia     Labile hypertension     Peripheral cyanosis     Peripheral cyanosis     Peripheral neuropathy     Peripheral neuropathy     Recurrent UTI     Tachycardia 6/25/2012    TIA (transient ischemic attack) 10/09    Vitamin D deficiency disease 8/9/2013     Past Surgical History:   Procedure Laterality Date    ECHO 2D ADULT  5/18/12    LVH, EF 60%    HERNIA REPAIR      right inguinal    STRESS TEST, LEXISCAN  5/18/12    3 min, normal perfusion, EF 72%     Social Hx:  reports that she has never smoked. She has never used smokeless tobacco. She reports that she does not drink alcohol and does not use drugs.  Family Hx: family history includes COPD in her sister; Cancer in her sister; Coronary Art Dis in her brother; Diabetes in her sister and son; Heart Surgery in her

## 2025-07-28 NOTE — ED PROVIDER NOTES
[facility] EMERGENCY DEPARTMENT  EMERGENCY DEPARTMENT ENCOUNTER      Pt Name: Laina Bashir  MRN: [id number]  Birthdate 3/25/1932  Date of evaluation: 7/28/2025  Provider: Mago Macias DO    CHIEF COMPLAINT       Chief Complaint   Patient presents with    Atrial Fibrillation       PMH   Past Medical History:   Diagnosis Date    Chronic kidney failure, stage 2 (mild)     Dyslipidemia 8/9/2013    HTN (hypertension)     Hyperlipemia     Labile hypertension     Peripheral cyanosis     Peripheral cyanosis     Peripheral neuropathy     Peripheral neuropathy     Recurrent UTI     Tachycardia 6/25/2012    TIA (transient ischemic attack) 10/09    Vitamin D deficiency disease 8/9/2013         MDM:   Vitals:    Vitals:    07/28/25 1036   BP: 97/77   Pulse: (!) 153   Resp: 18   Temp: 98.2 °F (36.8 °C)   SpO2: 96%           This is a 89 y.o. female with pmhx HTN, HLD, TIA, CKD, CHF who presents today for cc of palpitations and PULIDO . Patient states over the last 3 days she has had the feeling of her heart racing, feeling short of breath on exertion. No chest pain. Feels a bit lightheaded as well upon standing. Denies leg swelling. States \"my granddaughter told me I might have afib\" but unclear if this is new diagnosis or chronic diagnosis for her. No blood thinners.   . Otherwise denies fever, chills, abdominal pain, nausea, vomiting, urinary symptoms, and changes in bowel habits.    On arrival VS includes tachycardia, otherwise stable.   Physical Exam  General: Alert, no acute distress  HEENT: Normocephalic, atraumatic. EOMI,  moist oral mucosa, no conjunctival injection  Neck: ROM normal, supple  Cardio: Heart tachycardic rate and irregularly irregular rhythm, cap refill <2seconds  Lungs: no respiratory distress, lungs CTAB, no wheezes, rhonchi or rales    Abdomen: abdomen soft, nontender  MSK:ROM normal, scant pitting edema b/l LE  Skin: Warm, dry, no rash  Neuro: No focal neurodeficits, AOx3    EKG

## 2025-07-28 NOTE — H&P
History and Physical    Date of Service:  7/28/2025  Primary Care Provider: Arnold Chen MD  Source of information: Patient, Family, External Medical Records    Chief Complaint: Atrial Fibrillation      History of Presenting Illness:   Laina Bashir is a very pleasant 89 y.o. female with a past medical history of HTN, HLD, TIA, CKD, heart failure who presents to the emergency room due to palpitations and lightheadedness.    Patient has had intermittent palpitations and lightheadedness for the past couple of days.  She states that she has had some mild dyspnea on exertion, and when the episodes happen she has to stop.  She had an episode again this morning and states her granddaughter brought her into the ER concern for A-fib.  Her daughter is a registered nurse previously at [facility], who now works for [employer].    In the ER patient was initially in A-fib with RVR, and amiodarone was started.  However she self converted, and had resolution of symptoms.  She does have a slightly elevated BNP with dyspnea and Lasix was given in the ER.  Internal medicine was consulted for admission       REVIEW OF SYSTEMS:  A comprehensive review of systems was negative except for that written in the History of Present Illness.     Past Medical History:   Diagnosis Date    Chronic kidney failure, stage 2 (mild)     Dyslipidemia 8/9/2013    HTN (hypertension)     Hyperlipemia     Labile hypertension     Peripheral cyanosis     Peripheral cyanosis     Peripheral neuropathy     Peripheral neuropathy     Recurrent UTI     Tachycardia 6/25/2012    TIA (transient ischemic attack) 10/09    Vitamin D deficiency disease 8/9/2013      Past Surgical History:   Procedure Laterality Date    ECHO 2D ADULT  5/18/12    LVH, EF 60%    HERNIA REPAIR      right inguinal    STRESS TEST, LEXISCAN  5/18/12    3 min, normal perfusion, EF 72%     Prior to Admission medications    Medication Sig Start Date End Date Taking? Authorizing

## 2025-07-28 NOTE — ED TRIAGE NOTES
Patient arrives to ed via pov with c/o heart racing and sob since approx after 8am that pt sts has improved some. Pt endorses lightheadedness x few days intermittently. Pt denies blood thinner use or hx of irregular rhythms.

## 2025-07-28 NOTE — ED NOTES
Gilberto at bedside. Stated to repeat EKG and hold onto amiodarone at this time and to give if heart rate increases back over 100.  At this time HR is 74.

## 2025-07-28 NOTE — ED NOTES
RN attempted to give report RN unavailable, told they will call back     After looking at bed board, pt bed was removed.     Coreg was ordered at 1532 and is due for 1700.

## 2025-07-28 NOTE — ED TRIAGE NOTES
10:34 AM  I have evaluated the patient as the Provider in Rapid Medical Evaluation (RME). I have reviewed her vital signs and the triage nurse assessment. I have talked with the patient and any available family and advised that I am the provider in triage and have ordered the appropriate study to initiate their work up based on the clinical presentation during my assessment. I have advised that the patient will be accommodated in the Main ED as soon as possible. I have also requested to contact the triage nurse or myself immediately if the patient experiences any changes in their condition during this brief waiting period.    Starting at 0800 heart racing. Lightheaded for the past few days that is intermittent. No blood thinners. No history of same. B/P 97/77 in triage.  RITA Miranda - NP

## 2025-07-28 NOTE — ACP (ADVANCE CARE PLANNING)
Advance Care Planning     Advance Care Planning (ACP) Physician/NP/PA Conversation    Date of Conversation: 7/28/2025  Conducted with: Patient with Decision Making Capacity, Healthcare Decision Maker, and Legal next of kin    Healthcare Decision Maker:  No healthcare decision makers have been documented.       Today we documented Decision Maker(s) consistent with Legal Next of Kin hierarchy.    Care Preferences:    Hospitalization:  \"If your health worsens and it becomes clear that your chance of recovery is unlikely, what would be your preference regarding hospitalization?\"  The patient would prefer hospitalization.    Ventilation:  \"If you were unable to breath on your own and your chance of recovery was unlikely, what would be your preference about the use of a ventilator (breathing machine) if it was available to you?\"  The patient would desire the use of a ventilator.    Resuscitation:  \"In the event your heart stopped as a result of an underlying serious health condition, would you want attempts made to restart your heart, or would you prefer a natural death?\"  No, do NOT attempt to resuscitate.    treatment goals, ventilation preferences, hospitalization preferences, and resuscitation preferences    Conversation Outcomes / Follow-Up Plan:  ACP complete - no further action today  Reviewed DNR/DNI and patient confirms current DNR status - completed forms on file (place new order if needed)    Patient is a 89-year-old woman being admitted for A-fib, slight volume overload.  We had a discussion regarding her CODE STATUS, and her goals of care.  Both of her sons, Jeff and Hussain are at bedside and state they have discussed CODE STATUS in the past, and wanted to have a further discussion.  In fact she was supposed to follow-up with her primary care in the future to have an advance care planning discussion.  We have an extensive discussion regarding her CODE STATUS and she would like to fill out a DNR form today.

## 2025-07-29 VITALS
HEART RATE: 77 BPM | TEMPERATURE: 98.8 F | BODY MASS INDEX: 26.43 KG/M2 | SYSTOLIC BLOOD PRESSURE: 109 MMHG | DIASTOLIC BLOOD PRESSURE: 60 MMHG | WEIGHT: 140 LBS | OXYGEN SATURATION: 97 % | HEIGHT: 61 IN | RESPIRATION RATE: 20 BRPM

## 2025-07-29 PROBLEM — I48.91 ATRIAL FIBRILLATION WITH RAPID VENTRICULAR RESPONSE (HCC): Status: ACTIVE | Noted: 2025-07-29

## 2025-07-29 LAB
ANION GAP SERPL CALC-SCNC: 6 MMOL/L (ref 2–12)
BASOPHILS # BLD: 0.19 K/UL (ref 0–0.1)
BASOPHILS NFR BLD: 1.8 % (ref 0–1)
BUN SERPL-MCNC: 16 MG/DL (ref 6–20)
BUN/CREAT SERPL: 18 (ref 12–20)
CALCIUM SERPL-MCNC: 9 MG/DL (ref 8.5–10.1)
CHLORIDE SERPL-SCNC: 105 MMOL/L (ref 97–108)
CO2 SERPL-SCNC: 28 MMOL/L (ref 21–32)
CREAT SERPL-MCNC: 0.91 MG/DL (ref 0.55–1.02)
DIFFERENTIAL METHOD BLD: ABNORMAL
ECHO AO ASC DIAM: 3 CM
ECHO AO ASCENDING AORTA INDEX: 1.85 CM/M2
ECHO AO ROOT DIAM: 2.8 CM
ECHO AO ROOT INDEX: 1.73 CM/M2
ECHO AV AREA PEAK VELOCITY: 1.6 CM2
ECHO AV AREA VTI: 1.8 CM2
ECHO AV AREA/BSA PEAK VELOCITY: 1 CM2/M2
ECHO AV AREA/BSA VTI: 1.1 CM2/M2
ECHO AV MEAN GRADIENT: 6 MMHG
ECHO AV MEAN VELOCITY: 1.2 M/S
ECHO AV PEAK GRADIENT: 11 MMHG
ECHO AV PEAK VELOCITY: 1.7 M/S
ECHO AV VELOCITY RATIO: 0.53
ECHO AV VTI: 36.2 CM
ECHO BSA: 1.65 M2
ECHO EST RA PRESSURE: 3 MMHG
ECHO LA DIAMETER INDEX: 2.59 CM/M2
ECHO LA DIAMETER: 4.2 CM
ECHO LA TO AORTIC ROOT RATIO: 1.5
ECHO LA VOL A-L A2C: 66 ML (ref 22–52)
ECHO LA VOL A-L A4C: 71 ML (ref 22–52)
ECHO LA VOL BP: 69 ML (ref 22–52)
ECHO LA VOL MOD A2C: 64 ML (ref 22–52)
ECHO LA VOL MOD A4C: 65 ML (ref 22–52)
ECHO LA VOL/BSA BIPLANE: 43 ML/M2 (ref 16–34)
ECHO LA VOLUME AREA LENGTH: 75 ML
ECHO LA VOLUME INDEX A-L A2C: 41 ML/M2 (ref 16–34)
ECHO LA VOLUME INDEX A-L A4C: 44 ML/M2 (ref 16–34)
ECHO LA VOLUME INDEX AREA LENGTH: 46 ML/M2 (ref 16–34)
ECHO LA VOLUME INDEX MOD A2C: 40 ML/M2 (ref 16–34)
ECHO LA VOLUME INDEX MOD A4C: 40 ML/M2 (ref 16–34)
ECHO LV E' LATERAL VELOCITY: 7.24 CM/S
ECHO LV E' SEPTAL VELOCITY: 5.23 CM/S
ECHO LV EDV A2C: 107 ML
ECHO LV EDV A4C: 128 ML
ECHO LV EDV BP: 122 ML (ref 56–104)
ECHO LV EDV INDEX A4C: 79 ML/M2
ECHO LV EDV INDEX BP: 75 ML/M2
ECHO LV EDV NDEX A2C: 66 ML/M2
ECHO LV EF PHYSICIAN: 55 %
ECHO LV EJECTION FRACTION A2C: 52 %
ECHO LV EJECTION FRACTION A4C: 56 %
ECHO LV ESV A2C: 51 ML
ECHO LV ESV A4C: 56 ML
ECHO LV ESV BP: 56 ML (ref 19–49)
ECHO LV ESV INDEX A2C: 31 ML/M2
ECHO LV ESV INDEX A4C: 35 ML/M2
ECHO LV ESV INDEX BP: 35 ML/M2
ECHO LV FRACTIONAL SHORTENING: 33 % (ref 28–44)
ECHO LV INTERNAL DIMENSION DIASTOLE INDEX: 3.21 CM/M2
ECHO LV INTERNAL DIMENSION DIASTOLIC: 5.2 CM (ref 3.9–5.3)
ECHO LV INTERNAL DIMENSION SYSTOLIC INDEX: 2.16 CM/M2
ECHO LV INTERNAL DIMENSION SYSTOLIC: 3.5 CM
ECHO LV IVSD: 1 CM (ref 0.6–0.9)
ECHO LV MASS 2D: 194.2 G (ref 67–162)
ECHO LV MASS INDEX 2D: 119.8 G/M2 (ref 43–95)
ECHO LV POSTERIOR WALL DIASTOLIC: 1 CM (ref 0.6–0.9)
ECHO LV RELATIVE WALL THICKNESS RATIO: 0.38
ECHO LVOT AREA: 2.8 CM2
ECHO LVOT AV VTI INDEX: 0.6
ECHO LVOT DIAM: 1.9 CM
ECHO LVOT MEAN GRADIENT: 2 MMHG
ECHO LVOT PEAK GRADIENT: 4 MMHG
ECHO LVOT PEAK VELOCITY: 0.9 M/S
ECHO LVOT STROKE VOLUME INDEX: 38.3 ML/M2
ECHO LVOT SV: 62.1 ML
ECHO LVOT VTI: 21.9 CM
ECHO MV A VELOCITY: 0.81 M/S
ECHO MV AREA VTI: 2.1 CM2
ECHO MV E DECELERATION TIME (DT): 106.7 MS
ECHO MV E VELOCITY: 0.71 M/S
ECHO MV E/A RATIO: 0.88
ECHO MV E/E' LATERAL: 9.81
ECHO MV E/E' RATIO (AVERAGED): 11.69
ECHO MV E/E' SEPTAL: 13.58
ECHO MV LVOT VTI INDEX: 1.33
ECHO MV MAX VELOCITY: 1.4 M/S
ECHO MV MEAN GRADIENT: 4 MMHG
ECHO MV MEAN VELOCITY: 0.9 M/S
ECHO MV PEAK GRADIENT: 7 MMHG
ECHO MV REGURGITANT PEAK GRADIENT: 85 MMHG
ECHO MV REGURGITANT PEAK VELOCITY: 4.6 M/S
ECHO MV VTI: 29.1 CM
ECHO PV MAX VELOCITY: 0.9 M/S
ECHO PV MEAN GRADIENT: 2 MMHG
ECHO PV MEAN VELOCITY: 0.6 M/S
ECHO PV PEAK GRADIENT: 3 MMHG
ECHO RIGHT VENTRICULAR SYSTOLIC PRESSURE (RVSP): 27 MMHG
ECHO RV FREE WALL PEAK S': 11.7 CM/S
ECHO RV INTERNAL DIMENSION: 3 CM
ECHO RV TAPSE: 2.2 CM (ref 1.7–?)
ECHO RVOT MEAN GRADIENT: 1 MMHG
ECHO RVOT PEAK GRADIENT: 2 MMHG
ECHO RVOT PEAK VELOCITY: 0.7 M/S
ECHO RVOT VTI: 14.6 CM
ECHO TV REGURGITANT MAX VELOCITY: 2.47 M/S
ECHO TV REGURGITANT PEAK GRADIENT: 24 MMHG
EOSINOPHIL # BLD: 0.52 K/UL (ref 0–0.4)
EOSINOPHIL NFR BLD: 4.8 % (ref 0–7)
ERYTHROCYTE [DISTWIDTH] IN BLOOD BY AUTOMATED COUNT: 20.5 % (ref 11.5–14.5)
GLUCOSE BLD STRIP.AUTO-MCNC: 98 MG/DL (ref 65–117)
GLUCOSE SERPL-MCNC: 69 MG/DL (ref 65–100)
HCT VFR BLD AUTO: 55.1 % (ref 35–47)
HGB BLD-MCNC: 15.2 G/DL (ref 11.5–16)
IMM GRANULOCYTES # BLD AUTO: 0.06 K/UL (ref 0–0.04)
IMM GRANULOCYTES NFR BLD AUTO: 0.6 % (ref 0–0.5)
LYMPHOCYTES # BLD: 1.4 K/UL (ref 0.8–3.5)
LYMPHOCYTES NFR BLD: 13 % (ref 12–49)
MAGNESIUM SERPL-MCNC: 1.8 MG/DL (ref 1.6–2.4)
MCH RBC QN AUTO: 19.1 PG (ref 26–34)
MCHC RBC AUTO-ENTMCNC: 27.6 G/DL (ref 30–36.5)
MCV RBC AUTO: 69.3 FL (ref 80–99)
MONOCYTES # BLD: 0.68 K/UL (ref 0–1)
MONOCYTES NFR BLD: 6.3 % (ref 5–13)
NEUTS SEG # BLD: 7.94 K/UL (ref 1.8–8)
NEUTS SEG NFR BLD: 73.5 % (ref 32–75)
NRBC # BLD: 0 K/UL (ref 0–0.01)
NRBC BLD-RTO: 0 PER 100 WBC
NT PRO BNP: 3112 PG/ML
PHOSPHATE SERPL-MCNC: 3.4 MG/DL (ref 2.6–4.7)
PLATELET # BLD AUTO: 461 K/UL (ref 150–400)
POTASSIUM SERPL-SCNC: 3.3 MMOL/L (ref 3.5–5.1)
RBC # BLD AUTO: 7.95 M/UL (ref 3.8–5.2)
RBC MORPH BLD: ABNORMAL
SERVICE CMNT-IMP: NORMAL
SODIUM SERPL-SCNC: 139 MMOL/L (ref 136–145)
WBC # BLD AUTO: 10.8 K/UL (ref 3.6–11)

## 2025-07-29 PROCEDURE — 83880 ASSAY OF NATRIURETIC PEPTIDE: CPT

## 2025-07-29 PROCEDURE — 6360000002 HC RX W HCPCS: Performed by: STUDENT IN AN ORGANIZED HEALTH CARE EDUCATION/TRAINING PROGRAM

## 2025-07-29 PROCEDURE — 97165 OT EVAL LOW COMPLEX 30 MIN: CPT

## 2025-07-29 PROCEDURE — 6360000002 HC RX W HCPCS: Performed by: INTERNAL MEDICINE

## 2025-07-29 PROCEDURE — 97535 SELF CARE MNGMENT TRAINING: CPT

## 2025-07-29 PROCEDURE — 82962 GLUCOSE BLOOD TEST: CPT

## 2025-07-29 PROCEDURE — APPSS30 APP SPLIT SHARED TIME 16-30 MINUTES: Performed by: NURSE PRACTITIONER

## 2025-07-29 PROCEDURE — 83735 ASSAY OF MAGNESIUM: CPT

## 2025-07-29 PROCEDURE — 93306 TTE W/DOPPLER COMPLETE: CPT | Performed by: INTERNAL MEDICINE

## 2025-07-29 PROCEDURE — 84100 ASSAY OF PHOSPHORUS: CPT

## 2025-07-29 PROCEDURE — 6370000000 HC RX 637 (ALT 250 FOR IP): Performed by: NURSE PRACTITIONER

## 2025-07-29 PROCEDURE — 97116 GAIT TRAINING THERAPY: CPT

## 2025-07-29 PROCEDURE — 6370000000 HC RX 637 (ALT 250 FOR IP): Performed by: INTERNAL MEDICINE

## 2025-07-29 PROCEDURE — 85025 COMPLETE CBC W/AUTO DIFF WBC: CPT

## 2025-07-29 PROCEDURE — 94761 N-INVAS EAR/PLS OXIMETRY MLT: CPT

## 2025-07-29 PROCEDURE — 97161 PT EVAL LOW COMPLEX 20 MIN: CPT

## 2025-07-29 PROCEDURE — 80048 BASIC METABOLIC PNL TOTAL CA: CPT

## 2025-07-29 PROCEDURE — 99233 SBSQ HOSP IP/OBS HIGH 50: CPT | Performed by: INTERNAL MEDICINE

## 2025-07-29 RX ORDER — FUROSEMIDE 40 MG/1
40 TABLET ORAL DAILY
Qty: 30 TABLET | Refills: 0 | Status: SHIPPED | OUTPATIENT
Start: 2025-07-29

## 2025-07-29 RX ORDER — METOPROLOL SUCCINATE 25 MG/1
25 TABLET, EXTENDED RELEASE ORAL DAILY
Qty: 30 TABLET | Refills: 3 | Status: SHIPPED | OUTPATIENT
Start: 2025-07-30

## 2025-07-29 RX ORDER — METOPROLOL SUCCINATE 25 MG/1
25 TABLET, EXTENDED RELEASE ORAL DAILY
Status: DISCONTINUED | OUTPATIENT
Start: 2025-07-29 | End: 2025-07-29 | Stop reason: HOSPADM

## 2025-07-29 RX ORDER — METOPROLOL TARTRATE 1 MG/ML
5 INJECTION, SOLUTION INTRAVENOUS ONCE
Status: COMPLETED | OUTPATIENT
Start: 2025-07-29 | End: 2025-07-29

## 2025-07-29 RX ADMIN — APIXABAN 2.5 MG: 2.5 TABLET, FILM COATED ORAL at 12:05

## 2025-07-29 RX ADMIN — ENOXAPARIN SODIUM 40 MG: 100 INJECTION SUBCUTANEOUS at 08:32

## 2025-07-29 RX ADMIN — CARVEDILOL 12.5 MG: 12.5 TABLET, FILM COATED ORAL at 08:31

## 2025-07-29 RX ADMIN — METOPROLOL TARTRATE 5 MG: 5 INJECTION INTRAVENOUS at 09:23

## 2025-07-29 RX ADMIN — FUROSEMIDE 40 MG: 10 INJECTION, SOLUTION INTRAMUSCULAR; INTRAVENOUS at 08:31

## 2025-07-29 RX ADMIN — METOPROLOL SUCCINATE 25 MG: 25 TABLET, EXTENDED RELEASE ORAL at 12:14

## 2025-07-29 NOTE — DISCHARGE SUMMARY
Hospitalist Discharge Summary     Patient ID:  Laina Bashir  [id number]  89 y.o.  3/25/1932    Admit date: 7/28/2025    Discharge date and time: 7/29/2025    Admission Diagnoses: A-fib (HCC) [I48.91]  Atrial fibrillation with RVR (HCC) [I48.91]  Acute on chronic congestive heart failure, unspecified heart failure type (HCC) [I50.9]  Congestive heart failure, unspecified HF chronicity, unspecified heart failure type (HCC) [I50.9]    Discharge Diagnoses:    Principal Problem:    Atrial fibrillation with RVR (HCC)  Resolved Problems:    * No resolved hospital problems. *         Hospital Course:   Laina Bashir is a very pleasant 89 y.o. female with a past medical history of HTN, HLD, TIA, CKD, heart failure who presents to the emergency room due to palpitations and lightheadedness.        A-fib with RVR / SVT: POA. Acute. Recurrent. Cards evaluated. she had documented a fib on EKG in June 2024 but family/pt deny this as a hx. EF ok on echo  - started low dose eliquis (fall hx reviewed. Had 2 falls cca 1 year ago but none since. Discussed increased serious bleeding risk while in anticoag with patient and family at bedside)  - started metoprolol  - eliquis coupon will be provided by case management. Defer to PCP to price out xarelto to see if it's cheaper. The coupon should bridge them in the interim    Acute on chronic HF p EF: POA. As evidenced by elevated pBNP/sob on exertion. Diuresed with lasix  - discharged on lasix 40 PO per cards recs      HTN: POA  - stop HCTZ  - metoprolol as above     Mild AS: POA  - cards follow up      HLD  - cont statin    CKD 3A  - At baseline with a GFR of 53    Leukocytosis: POA. Resolved  - likely reactive     Thrombocytosis: POA. Reactive most likely.  - recheck at PCP follow up and arrange hematology referral if persistent    Imaging  XR CHEST PORTABLE  Result Date: 7/28/2025  1. Enlarged cardiac silhouette, otherwise no acute disease Electronically signed by Mirna Aggarwal

## 2025-07-29 NOTE — DISCHARGE INSTRUCTIONS
HOSPITALIST DISCHARGE INSTRUCTIONS  NAME:  Laina Bashir   :  3/25/1932   MRN:  [id number]     Date/Time:  2025 1:40 PM    ADMIT DATE: 2025     DISCHARGE DATE: 2025     DISCHARGE DIAGNOSIS:  Atrial fibrillation with rapid ventricular response    DISCHARGE INSTRUCTIONS:  Thank you for allowing us to participate in your care. Your discharging Hospitalist is Boom Barth MD. You were admitted for evaluation and treatment of the above. You were seen by our cardiology team and your medications were changed. You are well enough to be discharged now. Please take your meds as prescribed and follow up with your PCP.      MEDICATIONS:    It is important that you take the medication exactly as they are prescribed.   Keep your medication in the bottles provided by the pharmacist and keep a list of the medication names, dosages, and times to be taken in your wallet.   Do not take other medications without consulting your doctor.             If you experience any of the following symptoms then please call your primary care physician or return to the emergency room if you cannot get hold of your doctor:  Fever, chills, nausea, vomiting, diarrhea, change in mentation, falling, bleeding, shortness of breath    Follow Up:  Please call the below provider to arrange hospital follow up appointment      Arnold Chen MD    Follow up      Chioma Huang MD  [id number] [address] [address] [phone]    Follow up          Information obtained by :  I understand that if any problems occur once I am at home I am to contact my physician.    I understand and acknowledge receipt of the instructions indicated above.                                                                                                                                           Physician's or R.N.'s Signature                                                                  Date/Time

## 2025-07-29 NOTE — PLAN OF CARE
Problem: Physical Therapy - Adult  Goal: By Discharge: Performs mobility at highest level of function for planned discharge setting.  See evaluation for individualized goals.  Outcome: Progressing   PHYSICAL THERAPY EVALUATION/DISCHARGE    Patient: Laina Bashir (89 y.o. female)  Date: 7/29/2025  Primary Diagnosis: A-fib ([facility]) [I48.91]  Atrial fibrillation with RVR ([facility]) [I48.91]  Acute on chronic congestive heart failure, unspecified heart failure type (HCC) [I50.9]  Congestive heart failure, unspecified HF chronicity, unspecified heart failure type (HCC) [I50.9]       Precautions:              ASSESSMENT AND RECOMMENDATIONS:  Based on the objective data below, the patient admitted due to Afib with RVR and CHF.  Pt received supine in bed with son at bedside.  She is functioning at Mod I level to EOB, SBA with sit<>stand and CGA with gait of 150' with RW.  Gait into bathroom without asst device demonstrating one LOB with turning and CGA for recovery.  Son stating he wants his mother to use her SPC at all times. No reported falls, demonstrates balance issues.  Offered HHPT or Out pt PT yet pt declining at this time.  Stating occasional dizziness so performed orthostatics per below.  BP on soft side, yet pt asymptomatic.  RN alerted to to lower BP.  143/78 at 9am supine and now 109/60 standing. HR steady in 70's throughout session.  Patient Vitals for the past 6 hrs:   Pulse BP Patient Position   07/29/25 1157 77 109/60 --   07/29/25 1145 76 (!) 106/50 --   07/29/25 1108 77 109/60 Standing   07/29/25 1105 75 95/60 Standing   07/29/25 1103 76 (!) 106/55 Sitting   07/29/25 1058 75 (!) 108/57 Supine   07/29/25 1055 77 (!) 105/57 Supine   07/29/25 0931 95 -- --   07/29/25 0911 (!) 151 (!) 143/78 --   07/29/25 0908 (!) 127 -- --   07/29/25 0820 74 (!) 144/60 Supine        Functional Outcome Measure:  The patient scored 21/24 on the  outcome measure         Further skilled acute physical therapy is not indicated at this

## 2025-07-29 NOTE — CARE COORDINATION
Care Management Progress Note    Reason for Admission:   A-fib ([facility]) [I48.91]  Atrial fibrillation with RVR ([facility]) [I48.91]  Acute on chronic congestive heart failure, unspecified heart failure type ([facility]) [I50.9]  Congestive heart failure, unspecified HF chronicity, unspecified heart failure type ([facility]) [I50.9]         Patient Admission Status: Inpatient  RUR: 14%  Hospitalization in the last 30 days (Readmission):  No        CM reviewed EMR and pt was discussed in IRDs.  Reportedly, pt resides alone.    Transition Plan of Care  1.  Cardiology following for medical management  2.  PT/OT evals complete; pt ambulated 150 feet on 7/29 and no rehab therapies were recommended.  Pt's family member requested home health safety eval and pt agrees.  3.  CM sent a referral to [facility] (PT) and they denied.  Additional referrals were sent for review and [facility]  has accepted.    4.  CM provided Eliquis coupon to pt per order  5.  Outpatient follow up  6.  Family will transport pt home    No further discharge needs indicated.  Vipin

## 2025-07-29 NOTE — PROGRESS NOTES
[facility] CARDIOLOGY                    Cardiology Care Note     []Initial Encounter     [x]Follow-up    Patient Name: Laina Bashir - :3/25/1932 - MRN:[id number]  Primary Cardiologist: Dr Dominick Jackson   Consulting Cardiologist: Vasquez Alva MD     Reason for encounter: a fib     HPI:       Laina Bashir is a 89 y.o. female with PMH significant for HTN, HLD, TIA, CKD, CHF who presents today for cc of palpitations and PULIDO . Patient states over the last 3 days she has had the feeling of her heart racing, feeling short of breath on exertion. No chest pain. Feels a bit lightheaded as well upon standing. Denies leg swelling. States \"my granddaughter told me I might have afib\" but unclear if this is new diagnosis or chronic diagnosis for her. No blood thinners.  Her son tells me he took her out to breakfast this am and she did not mention any symptoms but then she called him later and said she had palpitations and was SOB     Sons at bedside say she is very active, no falls. No longer drives       Subjective:      Laina Bashir reports none  RRT this am for RVR     Assessment and Plan     1 Initial EKG with SVT, subsequent a fib, paroxysmal now  - she had documented a fib on EKG in 2024 but family/pt deny this as a hx   - change coreg to toprol xl uptitrate as needed   - start eliquis 2.5 mg po BIS  - Echo pending  - CHADs 2 Vasc: 5    2 A/C HF p EF  - elevated pBNP/sob on exertion   - lasix 40 mg IV qd change to lasix 40 mg po at discharge    4 HTN  - would not resume HCTZ  - cont BB    5 AS:   Mild per Dr Jackson last echo     Will sign off   OP f/u Dr Jackson            ____________________________________________________________    Cardiac testing  Stress test cardiolite 12 - 3 min, normal perfusion, EF 72%   Echo 2d adult 12 - LVH, EF 60%   Echo 13 - EF 55 % to 60 %,no regional wall motion abnormalities. Wall thickness was mildly increased, mild MR, mild TR, no change from previous 
0905 rapid response called for afib rvr. Pt denying SOB and CP, but feels palpitations. RRT nurse and NS at bedside. EKG and VS obtained. See chart. MD ordered 5mg IV metoprolol. RRT nurse administered med - see MAR. HR dropped to 95 afterwards. No further interventions at this time. Rn to continue monitoring.  
Rapid Response called at 0904    Responded to rapid response at 0904 for afib RVR    Provider at bedside: no  Interventions ordered: EKG, IV metop 5mg  Blood sugar: 98  Sepsis suspected: no  Transfer to higher level of care: n/a    Called for afib RVR. On arrival, primary RN obtaining EKG. On assessment, pt denies CP and SOB, endorses palpitations, A&Ox4, pulses palpable throughout. VS as follows: 98.8F, , RR 20, /78 (100), SpO2 98% on RA. MD notified; ordered 5mg metoprolol IV. RRT RN administered 5mg metoprolol IV, HR 95 on telemetry after administration. Encouraged primary RN to reach out with any further concerns, no further RRT interventions needed at this time.     Rapid ended at 0931    RRT RN assisted with transport to accepting unit: n/a   
patient evaluation is determined to be of the following complexity level: Low

## 2025-07-31 ENCOUNTER — HOSPITAL ENCOUNTER (EMERGENCY)
Facility: HOSPITAL | Age: 89
Discharge: HOME | End: 2025-08-01
Attending: EMERGENCY MEDICINE
Payer: MEDICARE

## 2025-07-31 DIAGNOSIS — I48.91 ATRIAL FIBRILLATION WITH RAPID VENTRICULAR RESPONSE (HCC): Primary | ICD-10-CM

## 2025-07-31 DIAGNOSIS — N30.00 ACUTE CYSTITIS WITHOUT HEMATURIA: ICD-10-CM

## 2025-07-31 DIAGNOSIS — R53.1 GENERALIZED WEAKNESS: ICD-10-CM

## 2025-07-31 LAB
ALBUMIN SERPL-MCNC: 4 G/DL (ref 3.5–5.2)
ALBUMIN/GLOB SERPL: 1.3 (ref 1.1–2.2)
ALP SERPL-CCNC: 147 U/L (ref 35–104)
ALT SERPL-CCNC: 21 U/L (ref 10–35)
ANION GAP SERPL CALC-SCNC: 16 MMOL/L (ref 2–14)
APPEARANCE UR: CLEAR
AST SERPL-CCNC: 60 U/L (ref 10–35)
BACTERIA URNS QL MICRO: ABNORMAL /HPF
BASOPHILS # BLD: 0.31 K/UL (ref 0–0.1)
BASOPHILS NFR BLD: 2 % (ref 0–1)
BILIRUB SERPL-MCNC: 2.3 MG/DL (ref 0–1.2)
BILIRUB UR QL: NEGATIVE
BUN SERPL-MCNC: 19 MG/DL (ref 8–23)
BUN/CREAT SERPL: 18 (ref 12–20)
CALCIUM SERPL-MCNC: 9.4 MG/DL (ref 8.2–9.6)
CHLORIDE SERPL-SCNC: 101 MMOL/L (ref 98–107)
CO2 SERPL-SCNC: 23 MMOL/L (ref 20–29)
COLOR UR: ABNORMAL
CREAT SERPL-MCNC: 1.01 MG/DL (ref 0.6–1)
DIFFERENTIAL METHOD BLD: ABNORMAL
EOSINOPHIL # BLD: 0.57 K/UL (ref 0–0.4)
EOSINOPHIL NFR BLD: 3.7 % (ref 0–7)
EPITH CASTS URNS QL MICRO: ABNORMAL /LPF
ERYTHROCYTE [DISTWIDTH] IN BLOOD BY AUTOMATED COUNT: 21.4 % (ref 11.5–14.5)
GLOBULIN SER CALC-MCNC: 3.1 G/DL (ref 2–4)
GLUCOSE SERPL-MCNC: 130 MG/DL (ref 65–100)
GLUCOSE UR STRIP.AUTO-MCNC: NEGATIVE MG/DL
HCT VFR BLD AUTO: 62.3 % (ref 35–47)
HGB BLD-MCNC: 17.4 G/DL (ref 11.5–16)
HGB UR QL STRIP: NEGATIVE
HYALINE CASTS URNS QL MICRO: ABNORMAL /LPF (ref 0–2)
IMM GRANULOCYTES # BLD AUTO: 0.16 K/UL (ref 0–0.04)
IMM GRANULOCYTES NFR BLD AUTO: 1 % (ref 0–0.5)
KETONES UR QL STRIP.AUTO: ABNORMAL MG/DL
LEUKOCYTE ESTERASE UR QL STRIP.AUTO: ABNORMAL
LYMPHOCYTES # BLD: 1.5 K/UL (ref 0.8–3.5)
LYMPHOCYTES NFR BLD: 9.7 % (ref 12–49)
MAGNESIUM SERPL-MCNC: 2.3 MG/DL (ref 1.7–2.3)
MCH RBC QN AUTO: 19.2 PG (ref 26–34)
MCHC RBC AUTO-ENTMCNC: 27.9 G/DL (ref 30–36.5)
MCV RBC AUTO: 68.8 FL (ref 80–99)
MONOCYTES # BLD: 0.95 K/UL (ref 0–1)
MONOCYTES NFR BLD: 6.1 % (ref 5–13)
NEUTS SEG # BLD: 12.01 K/UL (ref 1.8–8)
NEUTS SEG NFR BLD: 77.5 % (ref 32–75)
NITRITE UR QL STRIP.AUTO: POSITIVE
NRBC # BLD: 0 K/UL (ref 0–0.01)
NRBC BLD-RTO: 0 PER 100 WBC
PH UR STRIP: 6 (ref 5–8)
PLATELET # BLD AUTO: 664 K/UL (ref 150–400)
PMV BLD AUTO: 9.6 FL (ref 8.9–12.9)
POTASSIUM SERPL-SCNC: 4.9 MMOL/L (ref 3.5–5.1)
PROT SERPL-MCNC: 7.1 G/DL (ref 6.4–8.3)
PROT UR STRIP-MCNC: ABNORMAL MG/DL
RBC # BLD AUTO: 9.05 M/UL (ref 3.8–5.2)
RBC #/AREA URNS HPF: ABNORMAL /HPF (ref 0–5)
RBC MORPH BLD: ABNORMAL
SODIUM SERPL-SCNC: 140 MMOL/L (ref 136–145)
SP GR UR REFRACTOMETRY: 1.02 (ref 1–1.03)
TROPONIN T SERPL HS-MCNC: 20.1 NG/L (ref 0–14)
TROPONIN T SERPL HS-MCNC: 22.3 NG/L (ref 0–14)
URINE CULTURE IF INDICATED: ABNORMAL
UROBILINOGEN UR QL STRIP.AUTO: 1 EU/DL (ref 0.2–1)
WBC # BLD AUTO: 15.5 K/UL (ref 3.6–11)
WBC URNS QL MICRO: ABNORMAL /HPF (ref 0–4)

## 2025-07-31 PROCEDURE — 36415 COLL VENOUS BLD VENIPUNCTURE: CPT

## 2025-07-31 PROCEDURE — 81001 URINALYSIS AUTO W/SCOPE: CPT

## 2025-07-31 PROCEDURE — 87186 SC STD MICRODIL/AGAR DIL: CPT

## 2025-07-31 PROCEDURE — 84484 ASSAY OF TROPONIN QUANT: CPT

## 2025-07-31 PROCEDURE — 80053 COMPREHEN METABOLIC PANEL: CPT

## 2025-07-31 PROCEDURE — 99284 EMERGENCY DEPT VISIT MOD MDM: CPT

## 2025-07-31 PROCEDURE — 96375 TX/PRO/DX INJ NEW DRUG ADDON: CPT

## 2025-07-31 PROCEDURE — 93005 ELECTROCARDIOGRAM TRACING: CPT | Performed by: EMERGENCY MEDICINE

## 2025-07-31 PROCEDURE — 6360000002 HC RX W HCPCS: Performed by: EMERGENCY MEDICINE

## 2025-07-31 PROCEDURE — 96374 THER/PROPH/DIAG INJ IV PUSH: CPT

## 2025-07-31 PROCEDURE — 87086 URINE CULTURE/COLONY COUNT: CPT

## 2025-07-31 PROCEDURE — 87088 URINE BACTERIA CULTURE: CPT

## 2025-07-31 PROCEDURE — 83735 ASSAY OF MAGNESIUM: CPT

## 2025-07-31 PROCEDURE — 85025 COMPLETE CBC W/AUTO DIFF WBC: CPT

## 2025-07-31 RX ORDER — ONDANSETRON 2 MG/ML
4 INJECTION INTRAMUSCULAR; INTRAVENOUS ONCE
Status: COMPLETED | OUTPATIENT
Start: 2025-07-31 | End: 2025-07-31

## 2025-07-31 RX ORDER — METOPROLOL TARTRATE 1 MG/ML
5 INJECTION, SOLUTION INTRAVENOUS
Status: COMPLETED | OUTPATIENT
Start: 2025-07-31 | End: 2025-07-31

## 2025-07-31 RX ADMIN — ONDANSETRON 4 MG: 2 INJECTION, SOLUTION INTRAMUSCULAR; INTRAVENOUS at 10:51

## 2025-07-31 RX ADMIN — METOPROLOL TARTRATE 5 MG: 5 INJECTION INTRAVENOUS at 10:51

## 2025-07-31 ASSESSMENT — ENCOUNTER SYMPTOMS
ABDOMINAL PAIN: 0
COLOR CHANGE: 0
DIARRHEA: 0
BACK PAIN: 0
VOMITING: 0
NAUSEA: 0
SHORTNESS OF BREATH: 0
CONSTIPATION: 0

## 2025-07-31 ASSESSMENT — LIFESTYLE VARIABLES
HOW MANY STANDARD DRINKS CONTAINING ALCOHOL DO YOU HAVE ON A TYPICAL DAY: PATIENT DOES NOT DRINK
HOW OFTEN DO YOU HAVE A DRINK CONTAINING ALCOHOL: NEVER

## 2025-07-31 NOTE — CARE COORDINATION
08/01/25  11:43 AM    CM made aware that pt would prefer [facility] over the choice of [facility] now that she has had time  to make a decision. All clinicals now sent to [facility] in [facility], awaiting to hear if able to accept and begin auth process. CM following,      SHELLIE Lincoln MA, [facility]      Available via [employer]        07/31/25  3:56 PM    Patient recently discharged from facility two days ago, with [facility] following however had not started their home health evaluations yet. Pt presents with the same complaints as her last ER visit and states that \"I wish I had listened to the therapists more serious. I understand now why they were telling me I needed to go to inpatient rehab.\" CM noted pt with inpatient CM consult for I[facility].  CM met with pt and 2 family members at the bedside, pt and family express understanding that referral can still be sent with notes from previous stay of which pt recently dc less than 2 days ago,  and gave preference for CM to send referral to [facility] inpatient rehab (also alerted representative) and [facility] who are also reviewing referral now, however CM explained that auth would not be likely to come through until tomorrow, 8/1/25 and there was a high likelhood patient would remain holding in the ED until bed secured as unsure it pt has any inpatient hospitalization criteria met. CM informed MD and IDT of the above, CM continuing to follow patient with likely placement at either IPR, will be awaiting authorization from [facility] Managed Medicare plan. CM updated family. All questions answered. CM will continue to follow and assist with transportation if needed upon discharge/placement.    SHELLIE Lincoln MA, [facility]      Available via [employer]

## 2025-07-31 NOTE — ED TRIAGE NOTES
Patient arrived via EMS from home with cc palpitations, n/v. Patient denies chest pain, shortness of breath.     Afib RVR en route hr 150's

## 2025-07-31 NOTE — ED PROVIDER NOTES
Formerly named [facility] EMERGENCY DEPARTMENT  EMERGENCY DEPARTMENT ENCOUNTER      Pt Name: Laina Bashir  MRN: [id number]  Birthdate 3/25/1932  Date of evaluation: 7/31/2025  Provider: Isma Clarke MD    CHIEF COMPLAINT       Chief Complaint   Patient presents with    Atrial Fibrillation         HISTORY OF PRESENT ILLNESS   (Location/Symptom, Timing/Onset, Context/Setting, Quality, Duration, Modifying Factors, Severity)  Note limiting factors.   Laina Bashir is a 89 y.o. female who presents to the emergency department      The history is provided by the patient. No  was used.       Nursing Notes were reviewed.    REVIEW OF SYSTEMS    (2-9 systems for level 4, 10 or more for level 5)     Review of Systems   Constitutional:  Positive for fatigue. Negative for activity change, chills and fever.   HENT:  Negative for nosebleeds.    Eyes:  Negative for visual disturbance.   Respiratory:  Negative for shortness of breath.    Cardiovascular:  Positive for palpitations. Negative for chest pain.   Gastrointestinal:  Negative for abdominal pain, constipation, diarrhea, nausea and vomiting.   Genitourinary:  Negative for difficulty urinating, dysuria, hematuria and urgency.   Musculoskeletal:  Negative for back pain, neck pain and neck stiffness.   Skin:  Negative for color change.   Allergic/Immunologic: Negative for immunocompromised state.   Neurological:  Positive for light-headedness. Negative for dizziness, seizures, syncope, weakness, numbness and headaches.   Psychiatric/Behavioral:  Negative for behavioral problems, confusion, hallucinations, self-injury and suicidal ideas.        Except as noted above the remainder of the review of systems was reviewed and negative.       PAST MEDICAL HISTORY     Past Medical History:   Diagnosis Date    Chronic kidney failure, stage 2 (mild)     Dyslipidemia 8/9/2013    HTN (hypertension)     Hyperlipemia     Labile hypertension     Peripheral cyanosis

## 2025-07-31 NOTE — ED PROVIDER NOTES
ED SIGN OUT NOTE  Care assumed at [facility] 3:12 PM EDT    Patient was signed out to me by Dr. Clarke.     Patient is awaiting case management, PT/OT consult..    BP (!) 129/57   Pulse 75   Temp 98 °F (36.7 °C) (Oral)   Resp 18   SpO2 94%     Labs Reviewed   CBC WITH AUTO DIFFERENTIAL - Abnormal; Notable for the following components:       Result Value    WBC 15.5 (*)     RBC 9.05 (*)     Hemoglobin 17.4 (*)     Hematocrit 62.3 (*)     MCV 68.8 (*)     MCH 19.2 (*)     MCHC 27.9 (*)     RDW 21.4 (*)     Platelets 664 (*)     Neutrophils % 77.5 (*)     Lymphocytes % 9.7 (*)     Basophils % 2.0 (*)     Immature Granulocytes % 1.0 (*)     Neutrophils Absolute 12.01 (*)     Eosinophils Absolute 0.57 (*)     Basophils Absolute 0.31 (*)     Immature Granulocytes Absolute 0.16 (*)     All other components within normal limits   COMPREHENSIVE METABOLIC PANEL - Abnormal; Notable for the following components:    Anion Gap 16 (*)     Glucose 130 (*)     Creatinine 1.01 (*)     Est, Glom Filt Rate 52 (*)     Total Bilirubin 2.3 (*)     AST 60 (*)     Alk Phosphatase 147 (*)     All other components within normal limits   TROPONIN - Abnormal; Notable for the following components:    Troponin T 20.1 (*)     All other components within normal limits   TROPONIN - Abnormal; Notable for the following components:    Troponin T 22.3 (*)     All other components within normal limits   MAGNESIUM   URINALYSIS WITH REFLEX TO CULTURE     No orders to display       ED Course as of 07/31/25 1512   Thu Jul 31, 2025   1042 EKG interpretation: (Preliminary)  Rhythm: atrial fib; and irregular. Rate (approx.): 162; Axis: normal; P wave: normal; QRS interval: normal ; ST/T wave: non-specific changes; Other findings: abnormal ekg   [FD]   1237 Repeat EKG performed at 12:34 PM demonstrates sinus rhythm at rate of 74 with first-degree AV block, left axis deviation, nonischemic [JM]      ED Course User Index  [FD] Isma Clarke

## 2025-08-01 VITALS
OXYGEN SATURATION: 97 % | TEMPERATURE: 98 F | SYSTOLIC BLOOD PRESSURE: 116 MMHG | DIASTOLIC BLOOD PRESSURE: 61 MMHG | HEART RATE: 76 BPM | RESPIRATION RATE: 17 BRPM

## 2025-08-01 LAB
ANION GAP SERPL CALC-SCNC: 14 MMOL/L (ref 2–14)
BASOPHILS # BLD: 0.21 K/UL (ref 0–0.1)
BASOPHILS NFR BLD: 1.6 % (ref 0–1)
BUN SERPL-MCNC: 17 MG/DL (ref 8–23)
BUN/CREAT SERPL: 18 (ref 12–20)
CALCIUM SERPL-MCNC: 8.6 MG/DL (ref 8.2–9.6)
CHLORIDE SERPL-SCNC: 101 MMOL/L (ref 98–107)
CO2 SERPL-SCNC: 27 MMOL/L (ref 20–29)
CREAT SERPL-MCNC: 0.94 MG/DL (ref 0.6–1)
DIFFERENTIAL METHOD BLD: ABNORMAL
EOSINOPHIL # BLD: 0.51 K/UL (ref 0–0.4)
EOSINOPHIL NFR BLD: 3.8 % (ref 0–7)
ERYTHROCYTE [DISTWIDTH] IN BLOOD BY AUTOMATED COUNT: 21 % (ref 11.5–14.5)
GLUCOSE SERPL-MCNC: 100 MG/DL (ref 65–100)
HCT VFR BLD AUTO: 57.1 % (ref 35–47)
HGB BLD-MCNC: 15.9 G/DL (ref 11.5–16)
IMM GRANULOCYTES # BLD AUTO: 0.09 K/UL (ref 0–0.04)
IMM GRANULOCYTES NFR BLD AUTO: 0.7 % (ref 0–0.5)
LYMPHOCYTES # BLD: 1.43 K/UL (ref 0.8–3.5)
LYMPHOCYTES NFR BLD: 10.7 % (ref 12–49)
MCH RBC QN AUTO: 19.2 PG (ref 26–34)
MCHC RBC AUTO-ENTMCNC: 27.8 G/DL (ref 30–36.5)
MCV RBC AUTO: 69.1 FL (ref 80–99)
MONOCYTES # BLD: 0.88 K/UL (ref 0–1)
MONOCYTES NFR BLD: 6.6 % (ref 5–13)
NEUTS SEG # BLD: 10.26 K/UL (ref 1.8–8)
NEUTS SEG NFR BLD: 76.6 % (ref 32–75)
NRBC # BLD: 0 K/UL (ref 0–0.01)
NRBC BLD-RTO: 0 PER 100 WBC
PLATELET # BLD AUTO: 518 K/UL (ref 150–400)
POTASSIUM SERPL-SCNC: 3.8 MMOL/L (ref 3.5–5.1)
RBC # BLD AUTO: 8.26 M/UL (ref 3.8–5.2)
RBC MORPH BLD: ABNORMAL
SODIUM SERPL-SCNC: 142 MMOL/L (ref 136–145)
WBC # BLD AUTO: 13.4 K/UL (ref 3.6–11)
WBC MORPH BLD: ABNORMAL

## 2025-08-01 PROCEDURE — 97535 SELF CARE MNGMENT TRAINING: CPT

## 2025-08-01 PROCEDURE — 97116 GAIT TRAINING THERAPY: CPT

## 2025-08-01 PROCEDURE — 97161 PT EVAL LOW COMPLEX 20 MIN: CPT

## 2025-08-01 PROCEDURE — 83605 ASSAY OF LACTIC ACID: CPT

## 2025-08-01 PROCEDURE — 85025 COMPLETE CBC W/AUTO DIFF WBC: CPT

## 2025-08-01 PROCEDURE — 36415 COLL VENOUS BLD VENIPUNCTURE: CPT

## 2025-08-01 PROCEDURE — 96375 TX/PRO/DX INJ NEW DRUG ADDON: CPT

## 2025-08-01 PROCEDURE — 97165 OT EVAL LOW COMPLEX 30 MIN: CPT

## 2025-08-01 PROCEDURE — 80048 BASIC METABOLIC PNL TOTAL CA: CPT

## 2025-08-01 PROCEDURE — 2500000003 HC RX 250 WO HCPCS: Performed by: STUDENT IN AN ORGANIZED HEALTH CARE EDUCATION/TRAINING PROGRAM

## 2025-08-01 PROCEDURE — 6360000002 HC RX W HCPCS: Performed by: STUDENT IN AN ORGANIZED HEALTH CARE EDUCATION/TRAINING PROGRAM

## 2025-08-01 PROCEDURE — 6370000000 HC RX 637 (ALT 250 FOR IP): Performed by: EMERGENCY MEDICINE

## 2025-08-01 RX ORDER — FUROSEMIDE 40 MG/1
40 TABLET ORAL DAILY
Status: DISCONTINUED | OUTPATIENT
Start: 2025-08-01 | End: 2025-08-01 | Stop reason: HOSPADM

## 2025-08-01 RX ORDER — METOPROLOL SUCCINATE 25 MG/1
25 TABLET, EXTENDED RELEASE ORAL DAILY
Status: DISCONTINUED | OUTPATIENT
Start: 2025-08-01 | End: 2025-08-01 | Stop reason: HOSPADM

## 2025-08-01 RX ORDER — CEFPODOXIME PROXETIL 200 MG/1
200 TABLET, FILM COATED ORAL 2 TIMES DAILY
Qty: 20 TABLET | Refills: 0 | Status: SHIPPED | OUTPATIENT
Start: 2025-08-01 | End: 2025-08-11

## 2025-08-01 RX ADMIN — APIXABAN 2.5 MG: 2.5 TABLET, FILM COATED ORAL at 08:07

## 2025-08-01 RX ADMIN — METOPROLOL SUCCINATE 25 MG: 25 TABLET, EXTENDED RELEASE ORAL at 08:07

## 2025-08-01 RX ADMIN — WATER 2000 MG: 1 INJECTION INTRAMUSCULAR; INTRAVENOUS; SUBCUTANEOUS at 14:16

## 2025-08-01 RX ADMIN — FUROSEMIDE 40 MG: 40 TABLET ORAL at 08:07

## 2025-08-01 NOTE — DISCHARGE INSTRUCTIONS
You are seen today for evaluation of atrial fibrillation, and fatigue, as part of your workup you were discovered to have a urinary tract infection.  You have been given antibiotics for treatment.  Please take as instructed.  Please follow-up with your doctor in the next 24 to 48 hours.  Please return the emergency department if you are having increased fatigue, weakness, fevers, chills, nausea vomiting, flank pain, abdominal pain, or any other new or concerning symptoms    Thank you for allowing us to provide you with medical care today.  We realize that you have many choices for your emergency care needs.  We thank you for choosing [facility].  Please choose us in the future for any continued health care needs.      The exam and treatment you received in the Emergency Department were for an emergent problem and are not intended as complete care. It is important that you follow up with a doctor, nurse practitioner, or physician assistant for ongoing care. If your symptoms worsen or you do not improve as expected and you are unable to reach your usual health care provider, you should return to the Emergency Department. We are available 24 hours a day.      Please make an appointment with your healthcare provider(s) for follow up of your Emergency Department visit.  Take this sheet with you when you go to your follow-up visit.

## 2025-08-01 NOTE — PLAN OF CARE
Problem: Physical Therapy - Adult  Goal: By Discharge: Performs mobility at highest level of function for planned discharge setting.  See evaluation for individualized goals.  Description: FUNCTIONAL STATUS PRIOR TO ADMISSION: Patient reports using furniture for balance in home and a SPC for community mobility. She is typically independent to modified independent for ADLs.    HOME SUPPORT PRIOR TO ADMISSION: The patient lived alone with support in town from family.    Physical Therapy Goals  Initiated 8/1/2025  1.  Patient will move from supine to sit and sit to supine and scoot up and down in bed with supervision/set-up within 7 day(s).    2.  Patient will perform sit to stand with supervision/set-up within 7 day(s).  3.  Patient will transfer from bed to chair and chair to bed with supervision/set-up using the least restrictive device within 7 day(s).  4.  Patient will ambulate with supervision/set-up for 100 feet with the least restrictive device within 7 day(s).   5.  Patient will ascend/descend 4 stairs with 1 handrail(s) with modified independence within 7 day(s).    Outcome: Progressing  PHYSICAL THERAPY EVALUATION    Patient: Laina Bashir (89 y.o. female)  Date: 8/1/2025  Primary Diagnosis: No admission diagnoses are documented for this encounter.       Precautions:              ASSESSMENT :   DEFICITS/IMPAIRMENTS:   The patient is limited by decreased functional mobility, body mechanics, activity tolerance, endurance, balance, and tachycardia with activity     Based on the impairments listed above, the patient presents with a decline in function following return to the hospital for \"generalized weakness\" and atrial fibrillation. She was recently admitted for the same and discharged home 7/29 with  services. She now reports feeling more unsteady and with increasing difficulty managing at home. Her HR remained quite labile during session and ranged 104-153 BPM with an average ~135 during gait. During                                                                                                                                                                             [facility] AM-PAC®      Basic Mobility Inpatient Short Form (6-Clicks) Version 2  How much HELP from another person do you currently need... (If the patient hasn't done an activity recently, how much help from another person do you think they would need if they tried?) Total A Lot A Little None   1.  Turning from your back to your side while in a flat bed without using bedrails? []  1 []  2 []  3  [x]  4   2.  Moving from lying on your back to sitting on the side of a flat bed without using bedrails? []  1 []  2 [x]  3  []  4   3.  Moving to and from a bed to a chair (including a wheelchair)? []  1 []  2 [x]  3  []  4   4. Standing up from a chair using your arms (e.g. wheelchair or bedside chair)? []  1 []  2 [x]  3  []  4   5.  Walking in hospital room? []  1 []  2 [x]  3  []  4   6.  Climbing 3-5 steps with a railing? []  1 []  2 [x]  3  []  4     Raw Score: 19/24                            Cutoff score <=171,2,3 had higher odds of discharging home with home health or need of SNF/IPR.    1. Lizette Sherwood, Laina Eaton, Jae Liu, Lorrie Leyva, Jim Gupta, Stuart Sherwood.  Validity of the -PAC “6-Clicks” Inpatient Daily Activity and Basic Mobility Short Forms. Physical Therapy Mar 2014, 94 (3) 379-391; DOI: 10.2522/ptj.65506324  2. Laureano EVERETT, Felix SANDERS, Tommie SANDERS, Ankita SANDERS. Association of AM-PAC \"6-Clicks\" Basic Mobility and Daily Activity Scores With Discharge Destination. Phys Ther. 2021 Apr 4;101(4):bjnh201. doi: 10.1093/ptj/frjy137. PMID: 63191206.  3. Rosendo SANDERS, Akil D, Dali S, Catalina K, Maria G S. Activity Measure for Post-Acute Care \"6-Clicks\" Basic Mobility Scores Predict Discharge Destination After Acute Care Hospitalization in Select Patient Groups: A Retrospective, Observational Study. Arch

## 2025-08-01 NOTE — PROGRESS NOTES
[facility] Dosing Services    Ceftriaxone was automatically dose-adjusted per [facility] P&T Committee Protocol, with respect to indication.    Assessment/Plan: Ceftriaxone changed to 2 grams IV per P&T approved protocol for indication of sepsis.    Pharmacist LUI MATHIS [facility]

## 2025-08-01 NOTE — ED NOTES
ED SIGN OUT NOTE  Care assumed at [facility] 11:27 PM EDT    Patient was signed out to me by Dr. Chen.     Patient is awaiting placement per .    BP (!) 129/47   Pulse 75   Temp 98 °F (36.7 °C) (Oral)   Resp 18   SpO2 93%     Labs Reviewed   CBC WITH AUTO DIFFERENTIAL - Abnormal; Notable for the following components:       Result Value    WBC 15.5 (*)     RBC 9.05 (*)     Hemoglobin 17.4 (*)     Hematocrit 62.3 (*)     MCV 68.8 (*)     MCH 19.2 (*)     MCHC 27.9 (*)     RDW 21.4 (*)     Platelets 664 (*)     Neutrophils % 77.5 (*)     Lymphocytes % 9.7 (*)     Basophils % 2.0 (*)     Immature Granulocytes % 1.0 (*)     Neutrophils Absolute 12.01 (*)     Eosinophils Absolute 0.57 (*)     Basophils Absolute 0.31 (*)     Immature Granulocytes Absolute 0.16 (*)     All other components within normal limits   COMPREHENSIVE METABOLIC PANEL - Abnormal; Notable for the following components:    Anion Gap 16 (*)     Glucose 130 (*)     Creatinine 1.01 (*)     Est, Glom Filt Rate 52 (*)     Total Bilirubin 2.3 (*)     AST 60 (*)     Alk Phosphatase 147 (*)     All other components within normal limits   TROPONIN - Abnormal; Notable for the following components:    Troponin T 20.1 (*)     All other components within normal limits   TROPONIN - Abnormal; Notable for the following components:    Troponin T 22.3 (*)     All other components within normal limits   URINALYSIS WITH REFLEX TO CULTURE - Abnormal; Notable for the following components:    Protein, UA TRACE (*)     Ketones, Urine TRACE (*)     Nitrite, Urine Positive (*)     Leukocyte Esterase, Urine SMALL (*)     Urine Culture if Indicated URINE CULTURE ORDERED (*)     Epithelial Cells, UA MODERATE (*)     BACTERIA, URINE TOO NUMEROUS TO COUNT (*)     All other components within normal limits   CULTURE, URINE   MAGNESIUM     No orders to display       ED Course as of 07/31/25 2327   Thu Jul 31, 2025   1042 EKG interpretation:  (Preliminary)  Rhythm: atrial fib; and irregular. Rate (approx.): 162; Axis: normal; P wave: normal; QRS interval: normal ; ST/T wave: non-specific changes; Other findings: abnormal ekg   [FD]   1237 Repeat EKG performed at 12:34 PM demonstrates sinus rhythm at rate of 74 with first-degree AV block, left axis deviation, nonischemic [JM]      ED Course User Index  [FD] Isma Clarke MD  [JM] Deny Diallo MD       Diagnosis:   1. Atrial fibrillation with rapid ventricular response (HCC)    2. Generalized weakness        Disposition:   Ed Observation 07/31/2025 02:17:56 PM    Plan:   Change of shift.  Care of patient signed over to Dr. Mohr.     MD Gretta De Jesus Ryland, MD  08/01/25 0718

## 2025-08-01 NOTE — PLAN OF CARE
Problem: Occupational Therapy - Adult  Goal: By Discharge: Performs self-care activities at highest level of function for planned discharge setting.  See evaluation for individualized goals.  Description: FUNCTIONAL STATUS PRIOR TO ADMISSION:  Patient lives alone and is typically Ind-Mod I with ADLs. She has supportive family that lives in [place] [place]. Patient reports using a cane when walking out of the house    Occupational Therapy Goals:  Initiated 8/1/2025  1.  Patient will perform lower body dressing with Stand by Assist within 7 day(s).  2.  Patient will perform simple home management with Stand by Assist within 7 day(s).  3.  Patient will perform grooming with Stand by Assist within 7 day(s).  4.  Patient will perform toilet transfers with Stand by Assist  within 7 day(s).  5.  Patient will perform all aspects of toileting with Stand by Assist within 7 day(s).  6.  Patient will participate in upper extremity therapeutic exercise/activities with [place] for 10 minutes within 7 day(s).    7.  Patient will utilize energy conservation techniques during functional activities without cues within 7 day(s).    Outcome: Progressing     OCCUPATIONAL THERAPY EVALUATION    Patient: Laina Bashir (89 y.o. female)  Date: 8/1/2025  Primary Diagnosis: No admission diagnoses are documented for this encounter.         Precautions:                    ASSESSMENT :  The patient is limited by decreased functional mobility, independence in ADLs, strength, activity tolerance, coordination, and standing balance after presenting with lightheadedness, fatigue and afib with RVR. Patient is Ind at baseline, lives alone with supportive family nearby.     Patient completed functional mobility with Min-Mod A for ambulation without a device. Balance slightly improved with use of RW however still required Min A. She completed toilet transfer and toileting tasks with Min A. She likely requires Min-Mod A with lower body and standing ADLs at

## 2025-08-01 NOTE — CARE COORDINATION
Care Management Initial Assessment  8/1/2025 1:52 PM  If patient is discharged prior to next notation, then this note serves as note for discharge by case management      Advance Care Planning:  Code Status: Prior  Primary Healthcare Decision Maker: (P) Named in Scanned ACP Document   Advance Directive: Power of  for healthcare on file, has an advanced directive - a copy has been provided     __________________________________________________________________________  Assessment:      08/01/25 1350   Service Assessment   Patient Orientation Alert and Oriented   Cognition Alert   History Provided By Patient;Child/Family;Medical Record   Primary Caregiver Self   Accompanied By/Relationship family   Support Systems Children;Family Members   Patient's Healthcare Decision Maker is: Named in Scanned ACP Document   PCP Verified by CM Yes   Last Visit to PCP Within last 3 months   Prior Functional Level Independent in ADLs/IADLs   Current Functional Level Independent in ADLs/IADLs   Can patient return to prior living arrangement Yes   Ability to make needs known: Good   Family able to assist with home care needs: Yes   Would you like for me to discuss the discharge plan with any other family members/significant others, and if so, who? No   Financial Resources Medicare  ([facility] Medicare)   Social/Functional History   Lives With Alone   Type of Home House   Home Layout One level   Home Access Stairs to enter with rails   Entrance Stairs - Number of Steps 3-4 or 6-7   Entrance Stairs - Rails Both   Bathroom Shower/Tub Tub/Shower unit   Bathroom Toilet Handicap height   Bathroom Equipment Shower chair;Grab bars in shower   Home Equipment Cane   Receives Help From Family;Friend(s);Neighbor   Prior Level of Assist for ADLs Independent   Prior Level of Assist for Homemaking Independent   Homemaking Responsibilities Yes   Ambulation Assistance Independent   Prior Level of Assist for Transfers Independent   Active

## 2025-08-02 LAB
BACTERIA SPEC CULT: ABNORMAL
BACTERIA SPEC CULT: ABNORMAL
CC UR VC: ABNORMAL
EKG ATRIAL RATE: 74 BPM
EKG ATRIAL RATE: 75 BPM
EKG DIAGNOSIS: NORMAL
EKG P AXIS: 39 DEGREES
EKG P AXIS: 69 DEGREES
EKG P-R INTERVAL: 228 MS
EKG P-R INTERVAL: 254 MS
EKG Q-T INTERVAL: 220 MS
EKG Q-T INTERVAL: 330 MS
EKG Q-T INTERVAL: 414 MS
EKG QRS DURATION: 114 MS
EKG QRS DURATION: 114 MS
EKG QRS DURATION: 120 MS
EKG QTC CALCULATION (BAZETT): 244 MS
EKG QTC CALCULATION (BAZETT): 462 MS
EKG QTC CALCULATION (BAZETT): 476 MS
EKG R AXIS: -57 DEGREES
EKG R AXIS: -60 DEGREES
EKG R AXIS: -62 DEGREES
EKG T AXIS: 107 DEGREES
EKG T AXIS: 126 DEGREES
EKG T AXIS: 96 DEGREES
EKG VENTRICULAR RATE: 125 BPM
EKG VENTRICULAR RATE: 74 BPM
EKG VENTRICULAR RATE: 75 BPM
SERVICE CMNT-IMP: ABNORMAL

## 2025-08-02 PROCEDURE — 93010 ELECTROCARDIOGRAM REPORT: CPT | Performed by: INTERNAL MEDICINE

## 2025-08-05 LAB — LACTATE BLD-SCNC: 1.57 MMOL/L (ref 0.4–2)
